# Patient Record
Sex: FEMALE | Race: OTHER | NOT HISPANIC OR LATINO | Employment: UNEMPLOYED | URBAN - METROPOLITAN AREA
[De-identification: names, ages, dates, MRNs, and addresses within clinical notes are randomized per-mention and may not be internally consistent; named-entity substitution may affect disease eponyms.]

---

## 2023-10-07 ENCOUNTER — APPOINTMENT (OUTPATIENT)
Dept: RADIOLOGY | Facility: HOSPITAL | Age: 53
End: 2023-10-07
Payer: MEDICAID

## 2023-10-07 ENCOUNTER — HOSPITAL ENCOUNTER (EMERGENCY)
Facility: HOSPITAL | Age: 53
Discharge: HOME | End: 2023-10-07
Attending: STUDENT IN AN ORGANIZED HEALTH CARE EDUCATION/TRAINING PROGRAM | Admitting: EMERGENCY MEDICINE
Payer: MEDICAID

## 2023-10-07 VITALS
OXYGEN SATURATION: 100 % | BODY MASS INDEX: 28.25 KG/M2 | HEIGHT: 67 IN | WEIGHT: 180 LBS | SYSTOLIC BLOOD PRESSURE: 149 MMHG | HEART RATE: 83 BPM | RESPIRATION RATE: 18 BRPM | DIASTOLIC BLOOD PRESSURE: 70 MMHG | TEMPERATURE: 98.4 F

## 2023-10-07 DIAGNOSIS — K59.00 CONSTIPATION, UNSPECIFIED CONSTIPATION TYPE: Primary | ICD-10-CM

## 2023-10-07 LAB
ABO GROUP (TYPE) IN BLOOD: NORMAL
ALBUMIN SERPL BCP-MCNC: 4.1 G/DL (ref 3.4–5)
ALP SERPL-CCNC: 83 U/L (ref 33–110)
ALT SERPL W P-5'-P-CCNC: 14 U/L (ref 7–45)
ANION GAP SERPL CALC-SCNC: 13 MMOL/L (ref 10–20)
AST SERPL W P-5'-P-CCNC: 25 U/L (ref 9–39)
BASOPHILS # BLD AUTO: 0.05 X10*3/UL (ref 0–0.1)
BASOPHILS NFR BLD AUTO: 1 %
BILIRUB SERPL-MCNC: 0.3 MG/DL (ref 0–1.2)
BUN SERPL-MCNC: 17 MG/DL (ref 6–23)
BURR CELLS BLD QL SMEAR: NORMAL
CALCIUM SERPL-MCNC: 8.7 MG/DL (ref 8.6–10.3)
CARDIAC TROPONIN I PNL SERPL HS: 3 NG/L (ref 0–13)
CHLORIDE SERPL-SCNC: 105 MMOL/L (ref 98–107)
CO2 SERPL-SCNC: 21 MMOL/L (ref 21–32)
CREAT SERPL-MCNC: 0.88 MG/DL (ref 0.5–1.05)
DACRYOCYTES BLD QL SMEAR: NORMAL
EOSINOPHIL # BLD AUTO: 0.05 X10*3/UL (ref 0–0.7)
EOSINOPHIL NFR BLD AUTO: 1 %
ERYTHROCYTE [DISTWIDTH] IN BLOOD BY AUTOMATED COUNT: 21.2 % (ref 11.5–14.5)
GFR SERPL CREATININE-BSD FRML MDRD: 79 ML/MIN/1.73M*2
GLUCOSE SERPL-MCNC: 89 MG/DL (ref 74–99)
HCT VFR BLD AUTO: 25.8 % (ref 36–46)
HGB BLD-MCNC: 6.9 G/DL (ref 12–16)
HYPOCHROMIA BLD QL SMEAR: NORMAL
IMM GRANULOCYTES # BLD AUTO: 0.01 X10*3/UL (ref 0–0.7)
IMM GRANULOCYTES NFR BLD AUTO: 0.2 % (ref 0–0.9)
LIPASE SERPL-CCNC: 26 U/L (ref 9–82)
LYMPHOCYTES # BLD AUTO: 0.75 X10*3/UL (ref 1.2–4.8)
LYMPHOCYTES NFR BLD AUTO: 14.6 %
MCH RBC QN AUTO: 19.2 PG (ref 26–34)
MCHC RBC AUTO-ENTMCNC: 26.7 G/DL (ref 32–36)
MCV RBC AUTO: 72 FL (ref 80–100)
MONOCYTES # BLD AUTO: 0.36 X10*3/UL (ref 0.1–1)
MONOCYTES NFR BLD AUTO: 7 %
NEUTROPHILS # BLD AUTO: 3.93 X10*3/UL (ref 1.2–7.7)
NEUTROPHILS NFR BLD AUTO: 76.2 %
NRBC BLD-RTO: 0 /100 WBCS (ref 0–0)
OVALOCYTES BLD QL SMEAR: NORMAL
PLATELET # BLD AUTO: 298 X10*3/UL (ref 150–450)
PMV BLD AUTO: 10.2 FL (ref 7.5–11.5)
POTASSIUM SERPL-SCNC: 4.7 MMOL/L (ref 3.5–5.3)
PROT SERPL-MCNC: 7.2 G/DL (ref 6.4–8.2)
RBC # BLD AUTO: 3.6 X10*6/UL (ref 4–5.2)
RBC MORPH BLD: NORMAL
RH FACTOR (ANTIGEN D): NORMAL
SCHISTOCYTES BLD QL SMEAR: NORMAL
SODIUM SERPL-SCNC: 134 MMOL/L (ref 136–145)
TARGETS BLD QL SMEAR: NORMAL
WBC # BLD AUTO: 5.2 X10*3/UL (ref 4.4–11.3)

## 2023-10-07 PROCEDURE — 74177 CT ABD & PELVIS W/CONTRAST: CPT

## 2023-10-07 PROCEDURE — 80053 COMPREHEN METABOLIC PANEL: CPT | Performed by: PHYSICIAN ASSISTANT

## 2023-10-07 PROCEDURE — 36415 COLL VENOUS BLD VENIPUNCTURE: CPT | Performed by: PHYSICIAN ASSISTANT

## 2023-10-07 PROCEDURE — 96360 HYDRATION IV INFUSION INIT: CPT | Mod: 59

## 2023-10-07 PROCEDURE — 2500000004 HC RX 250 GENERAL PHARMACY W/ HCPCS (ALT 636 FOR OP/ED): Performed by: PHYSICIAN ASSISTANT

## 2023-10-07 PROCEDURE — 85025 COMPLETE CBC W/AUTO DIFF WBC: CPT | Performed by: PHYSICIAN ASSISTANT

## 2023-10-07 PROCEDURE — 99284 EMERGENCY DEPT VISIT MOD MDM: CPT | Performed by: STUDENT IN AN ORGANIZED HEALTH CARE EDUCATION/TRAINING PROGRAM

## 2023-10-07 PROCEDURE — 2550000001 HC RX 255 CONTRASTS: Performed by: STUDENT IN AN ORGANIZED HEALTH CARE EDUCATION/TRAINING PROGRAM

## 2023-10-07 PROCEDURE — 83690 ASSAY OF LIPASE: CPT | Performed by: PHYSICIAN ASSISTANT

## 2023-10-07 PROCEDURE — 74177 CT ABD & PELVIS W/CONTRAST: CPT | Performed by: RADIOLOGY

## 2023-10-07 PROCEDURE — 84484 ASSAY OF TROPONIN QUANT: CPT | Performed by: PHYSICIAN ASSISTANT

## 2023-10-07 RX ORDER — POLYETHYLENE GLYCOL 3350 17 G/17G
17 POWDER, FOR SOLUTION ORAL DAILY
Qty: 30 PACKET | Refills: 0 | Status: SHIPPED | OUTPATIENT
Start: 2023-10-07 | End: 2023-11-06

## 2023-10-07 RX ORDER — ONDANSETRON HYDROCHLORIDE 2 MG/ML
4 INJECTION, SOLUTION INTRAVENOUS ONCE
Status: DISCONTINUED | OUTPATIENT
Start: 2023-10-07 | End: 2023-10-08 | Stop reason: HOSPADM

## 2023-10-07 RX ORDER — BISACODYL 10 MG/1
10 SUPPOSITORY RECTAL DAILY
Qty: 12 SUPPOSITORY | Refills: 0 | Status: SHIPPED | OUTPATIENT
Start: 2023-10-07 | End: 2023-10-19

## 2023-10-07 RX ADMIN — SODIUM CHLORIDE 1000 ML: 9 INJECTION, SOLUTION INTRAVENOUS at 19:07

## 2023-10-07 RX ADMIN — IOHEXOL 75 ML: 350 INJECTION, SOLUTION INTRAVENOUS at 21:05

## 2023-10-07 ASSESSMENT — COLUMBIA-SUICIDE SEVERITY RATING SCALE - C-SSRS
6. HAVE YOU EVER DONE ANYTHING, STARTED TO DO ANYTHING, OR PREPARED TO DO ANYTHING TO END YOUR LIFE?: NO
1. IN THE PAST MONTH, HAVE YOU WISHED YOU WERE DEAD OR WISHED YOU COULD GO TO SLEEP AND NOT WAKE UP?: NO
2. HAVE YOU ACTUALLY HAD ANY THOUGHTS OF KILLING YOURSELF?: NO

## 2023-10-07 ASSESSMENT — PAIN DESCRIPTION - LOCATION: LOCATION: ABDOMEN

## 2023-10-07 ASSESSMENT — PAIN SCALES - GENERAL: PAINLEVEL_OUTOF10: 8

## 2023-10-07 ASSESSMENT — PAIN DESCRIPTION - FREQUENCY: FREQUENCY: CONSTANT/CONTINUOUS

## 2023-10-07 ASSESSMENT — PAIN DESCRIPTION - PAIN TYPE: TYPE: ACUTE PAIN

## 2023-10-07 ASSESSMENT — PAIN - FUNCTIONAL ASSESSMENT: PAIN_FUNCTIONAL_ASSESSMENT: 0-10

## 2023-10-07 ASSESSMENT — PAIN DESCRIPTION - DESCRIPTORS: DESCRIPTORS: PRESSURE

## 2024-05-07 ENCOUNTER — APPOINTMENT (OUTPATIENT)
Dept: PRIMARY CARE | Facility: CLINIC | Age: 54
End: 2024-05-07
Payer: MEDICAID

## 2024-05-07 ENCOUNTER — TELEPHONE (OUTPATIENT)
Dept: PRIMARY CARE | Facility: CLINIC | Age: 54
End: 2024-05-07

## 2024-05-27 ENCOUNTER — HOSPITAL ENCOUNTER (EMERGENCY)
Facility: HOSPITAL | Age: 54
Discharge: HOME | End: 2024-05-27
Attending: EMERGENCY MEDICINE
Payer: MEDICAID

## 2024-05-27 ENCOUNTER — APPOINTMENT (OUTPATIENT)
Dept: RADIOLOGY | Facility: HOSPITAL | Age: 54
End: 2024-05-27
Payer: MEDICAID

## 2024-05-27 VITALS
WEIGHT: 185 LBS | DIASTOLIC BLOOD PRESSURE: 64 MMHG | SYSTOLIC BLOOD PRESSURE: 125 MMHG | HEIGHT: 66 IN | TEMPERATURE: 98.6 F | BODY MASS INDEX: 29.73 KG/M2 | RESPIRATION RATE: 16 BRPM | HEART RATE: 70 BPM | OXYGEN SATURATION: 100 %

## 2024-05-27 DIAGNOSIS — G51.0 BELL'S PALSY: Primary | ICD-10-CM

## 2024-05-27 LAB
ALBUMIN SERPL BCP-MCNC: 3.9 G/DL (ref 3.4–5)
ALP SERPL-CCNC: 83 U/L (ref 33–110)
ALT SERPL W P-5'-P-CCNC: 16 U/L (ref 7–45)
ANION GAP SERPL CALC-SCNC: 12 MMOL/L (ref 10–20)
APAP SERPL-MCNC: <10 UG/ML
APTT PPP: 30 SECONDS (ref 27–38)
AST SERPL W P-5'-P-CCNC: 22 U/L (ref 9–39)
BASOPHILS # BLD AUTO: 0.04 X10*3/UL (ref 0–0.1)
BASOPHILS NFR BLD AUTO: 1.2 %
BILIRUB SERPL-MCNC: 0.4 MG/DL (ref 0–1.2)
BUN SERPL-MCNC: 17 MG/DL (ref 6–23)
CALCIUM SERPL-MCNC: 9.1 MG/DL (ref 8.6–10.3)
CARDIAC TROPONIN I PNL SERPL HS: <3 NG/L (ref 0–13)
CHLORIDE SERPL-SCNC: 107 MMOL/L (ref 98–107)
CO2 SERPL-SCNC: 24 MMOL/L (ref 21–32)
CREAT SERPL-MCNC: 0.99 MG/DL (ref 0.5–1.05)
DACRYOCYTES BLD QL SMEAR: NORMAL
EGFRCR SERPLBLD CKD-EPI 2021: 68 ML/MIN/1.73M*2
EOSINOPHIL # BLD AUTO: 0.11 X10*3/UL (ref 0–0.7)
EOSINOPHIL NFR BLD AUTO: 3.3 %
ERYTHROCYTE [DISTWIDTH] IN BLOOD BY AUTOMATED COUNT: 21.2 % (ref 11.5–14.5)
ETHANOL SERPL-MCNC: <10 MG/DL
GLUCOSE SERPL-MCNC: 112 MG/DL (ref 74–99)
HCT VFR BLD AUTO: 26 % (ref 36–46)
HGB BLD-MCNC: 6.8 G/DL (ref 12–16)
HYPOCHROMIA BLD QL SMEAR: NORMAL
IMM GRANULOCYTES # BLD AUTO: 0.01 X10*3/UL (ref 0–0.7)
IMM GRANULOCYTES NFR BLD AUTO: 0.3 % (ref 0–0.9)
INR PPP: 0.9 (ref 0.9–1.1)
LYMPHOCYTES # BLD AUTO: 1.2 X10*3/UL (ref 1.2–4.8)
LYMPHOCYTES NFR BLD AUTO: 35.9 %
MCH RBC QN AUTO: 18.3 PG (ref 26–34)
MCHC RBC AUTO-ENTMCNC: 26.2 G/DL (ref 32–36)
MCV RBC AUTO: 70 FL (ref 80–100)
MONOCYTES # BLD AUTO: 0.33 X10*3/UL (ref 0.1–1)
MONOCYTES NFR BLD AUTO: 9.9 %
NEUTROPHILS # BLD AUTO: 1.65 X10*3/UL (ref 1.2–7.7)
NEUTROPHILS NFR BLD AUTO: 49.4 %
NRBC BLD-RTO: 0 /100 WBCS (ref 0–0)
OVALOCYTES BLD QL SMEAR: NORMAL
PLATELET # BLD AUTO: 351 X10*3/UL (ref 150–450)
POLYCHROMASIA BLD QL SMEAR: NORMAL
POTASSIUM SERPL-SCNC: 4.2 MMOL/L (ref 3.5–5.3)
PROT SERPL-MCNC: 7 G/DL (ref 6.4–8.2)
PROTHROMBIN TIME: 10.3 SECONDS (ref 9.8–12.8)
RBC # BLD AUTO: 3.72 X10*6/UL (ref 4–5.2)
RBC MORPH BLD: NORMAL
SALICYLATES SERPL-MCNC: <3 MG/DL
SODIUM SERPL-SCNC: 139 MMOL/L (ref 136–145)
WBC # BLD AUTO: 3.3 X10*3/UL (ref 4.4–11.3)

## 2024-05-27 PROCEDURE — 70450 CT HEAD/BRAIN W/O DYE: CPT

## 2024-05-27 PROCEDURE — 85610 PROTHROMBIN TIME: CPT | Performed by: PHYSICIAN ASSISTANT

## 2024-05-27 PROCEDURE — 80053 COMPREHEN METABOLIC PANEL: CPT | Performed by: PHYSICIAN ASSISTANT

## 2024-05-27 PROCEDURE — 99285 EMERGENCY DEPT VISIT HI MDM: CPT

## 2024-05-27 PROCEDURE — 93005 ELECTROCARDIOGRAM TRACING: CPT

## 2024-05-27 PROCEDURE — 84484 ASSAY OF TROPONIN QUANT: CPT | Performed by: PHYSICIAN ASSISTANT

## 2024-05-27 PROCEDURE — 36415 COLL VENOUS BLD VENIPUNCTURE: CPT | Performed by: PHYSICIAN ASSISTANT

## 2024-05-27 PROCEDURE — 80179 DRUG ASSAY SALICYLATE: CPT | Performed by: PHYSICIAN ASSISTANT

## 2024-05-27 PROCEDURE — 70450 CT HEAD/BRAIN W/O DYE: CPT | Performed by: RADIOLOGY

## 2024-05-27 PROCEDURE — 85730 THROMBOPLASTIN TIME PARTIAL: CPT | Performed by: PHYSICIAN ASSISTANT

## 2024-05-27 PROCEDURE — 71045 X-RAY EXAM CHEST 1 VIEW: CPT | Performed by: RADIOLOGY

## 2024-05-27 PROCEDURE — 71045 X-RAY EXAM CHEST 1 VIEW: CPT

## 2024-05-27 PROCEDURE — 85025 COMPLETE CBC W/AUTO DIFF WBC: CPT | Performed by: PHYSICIAN ASSISTANT

## 2024-05-27 RX ORDER — VALACYCLOVIR HYDROCHLORIDE 1 G/1
1000 TABLET, FILM COATED ORAL 3 TIMES DAILY
Qty: 21 TABLET | Refills: 0 | Status: SHIPPED | OUTPATIENT
Start: 2024-05-27 | End: 2024-06-03

## 2024-05-27 RX ORDER — PREDNISONE 20 MG/1
60 TABLET ORAL DAILY
Qty: 21 TABLET | Refills: 0 | Status: SHIPPED | OUTPATIENT
Start: 2024-05-27 | End: 2024-06-03

## 2024-05-27 ASSESSMENT — COLUMBIA-SUICIDE SEVERITY RATING SCALE - C-SSRS
1. IN THE PAST MONTH, HAVE YOU WISHED YOU WERE DEAD OR WISHED YOU COULD GO TO SLEEP AND NOT WAKE UP?: NO
2. HAVE YOU ACTUALLY HAD ANY THOUGHTS OF KILLING YOURSELF?: NO
6. HAVE YOU EVER DONE ANYTHING, STARTED TO DO ANYTHING, OR PREPARED TO DO ANYTHING TO END YOUR LIFE?: NO

## 2024-05-27 ASSESSMENT — PAIN SCALES - GENERAL: PAINLEVEL_OUTOF10: 0 - NO PAIN

## 2024-05-27 ASSESSMENT — PAIN - FUNCTIONAL ASSESSMENT: PAIN_FUNCTIONAL_ASSESSMENT: 0-10

## 2024-05-27 NOTE — ED TRIAGE NOTES
Pt to ED from home c/o tingling to face that has resolved, pt states first noticed Saturday at 1000. Pt A&Ox4, answering questions appropriately in triage

## 2024-05-27 NOTE — ED PROVIDER NOTES
EMERGENCY MEDICINE EVALUATION NOTE    History of Present Illness     Chief Complaint:   Chief Complaint   Patient presents with    facial tingling - resolved       HPI: Sharon Reyes is a 54 y.o. female presents with a chief complaint of left-sided headache and facial tingling.  Patient reports that she has had a left-sided posterior headache now for about 2 days.  She reports that it then settled in her ear.  She noticed that the headache was starting to resolve but as the headache was resolving she started to get some tingling down the left side of her jaw earlier today.  Patient reports that she did not have any weakness or numbness anywhere just an abnormal tingling sensation in the left jaw area.  She denies any associated chest pain or shortness of breath.  Patient denies any weakness or numbness in any of her extremities.  She does port that she has a history of anemia.  Patient denies any history of any strokes.    Previous History   History reviewed. No pertinent past medical history.  History reviewed. No pertinent surgical history.  Social History     Tobacco Use    Smoking status: Never    Smokeless tobacco: Never   Vaping Use    Vaping status: Never Used   Substance Use Topics    Alcohol use: Never    Drug use: Yes     Types: Marijuana     No family history on file.  Allergies   Allergen Reactions    Toradol [Ketorolac] Other     diaphoresis     Current Outpatient Medications   Medication Instructions    predniSONE (DELTASONE) 60 mg, oral, Daily    valACYclovir (VALTREX) 1,000 mg, oral, 3 times daily       Physical Exam     Appearance: Alert, oriented , cooperative     Skin: Intact,  dry skin, no lesions, rash, petechiae or purpura.      Eyes: PERRLA, EOMs intact,  Conjunctiva pink      ENT: Hearing grossly intact. Pharynx clear     Neck: Supple. Trachea at midline.      Pulmonary: Clear bilaterally. No rales, rhonchi or wheezing. No accessory muscle use or stridor.     Cardiac: Normal rate and rhythm  without murmur     Abdomen: Soft, nontender, active bowel sounds.     Musculoskeletal: Full range of motion.  Equal push and pull bilateral lower extremities.  Equal  strength bilateral upper extremities.  Able to ambulate under her own power.     Neurological:Cranial nerves II through XII are grossly intact, normal sensation, no weakness, no focal findings identified.  No facial droop noted.     Results     Labs Reviewed   CBC WITH AUTO DIFFERENTIAL - Abnormal       Result Value    WBC 3.3 (*)     nRBC 0.0      RBC 3.72 (*)     Hemoglobin 6.8 (*)     Hematocrit 26.0 (*)     MCV 70 (*)     MCH 18.3 (*)     MCHC 26.2 (*)     RDW 21.2 (*)     Platelets 351      Neutrophils % 49.4      Immature Granulocytes %, Automated 0.3      Lymphocytes % 35.9      Monocytes % 9.9      Eosinophils % 3.3      Basophils % 1.2      Neutrophils Absolute 1.65      Immature Granulocytes Absolute, Automated 0.01      Lymphocytes Absolute 1.20      Monocytes Absolute 0.33      Eosinophils Absolute 0.11      Basophils Absolute 0.04     COMPREHENSIVE METABOLIC PANEL - Abnormal    Glucose 112 (*)     Sodium 139      Potassium 4.2      Chloride 107      Bicarbonate 24      Anion Gap 12      Urea Nitrogen 17      Creatinine 0.99      eGFR 68      Calcium 9.1      Albumin 3.9      Alkaline Phosphatase 83      Total Protein 7.0      AST 22      Bilirubin, Total 0.4      ALT 16     PROTIME-INR - Normal    Protime 10.3      INR 0.9     APTT - Normal    aPTT 30      Narrative:     The APTT is no longer used for monitoring Unfractionated Heparin Therapy. For monitoring Heparin Therapy, use the Heparin Assay.   TROPONIN I, HIGH SENSITIVITY - Normal    Troponin I, High Sensitivity <3      Narrative:     Less than 99th percentile of normal range cutoff-  Female and children under 18 years old <14 ng/L; Male <21 ng/L: Negative  Repeat testing should be performed if clinically indicated.     Female and children under 18 years old 14-50 ng/L; Male  21-50 ng/L:  Consistent with possible cardiac damage and possible increased clinical   risk. Serial measurements may help to assess extent of myocardial damage.     >50 ng/L: Consistent with cardiac damage, increased clinical risk and  myocardial infarction. Serial measurements may help assess extent of   myocardial damage.      NOTE: Children less than 1 year old may have higher baseline troponin   levels and results should be interpreted in conjunction with the overall   clinical context.     NOTE: Troponin I testing is performed using a different   testing methodology at [facility] than at other   [facility]. Direct result comparisons should only   be made within the same method.   ACUTE TOXICOLOGY PANEL, BLOOD - Normal    Acetaminophen <10.0      Salicylate  <3      Alcohol <10     URINALYSIS WITH REFLEX CULTURE AND MICROSCOPIC    Narrative:     The following orders were created for panel order Urinalysis with Reflex Culture and Microscopic.  Procedure                               Abnormality         Status                     ---------                               -----------         ------                     Urinalysis with Reflex C...[[id number]]                                                 Extra Urine Gray Tube[[id number]]                                                         Please view results for these tests on the individual orders.   URINALYSIS WITH REFLEX CULTURE AND MICROSCOPIC   EXTRA URINE GRAY TUBE   MORPHOLOGY    RBC Morphology See Below      Polychromasia Mild      Hypochromia Marked      Ovalocytes Few      Teardrop Cells Few       CT head wo IV contrast   Final Result   Negative exam.        MACRO:   None        Signed by: Nando Serrano 5/27/2024 6:20 PM   Dictation workstation:   [id number]      XR chest 1 view   Final Result   Stable surgical clips in the left upper quadrant of the abdomen.   Otherwise, negative exam.        MACRO:   None        Signed by: Nando  "Zach 5/27/2024 4:33 PM   Dictation workstation:   [id number]            ED Course & Medical Decision Making   Medications - No data to display  Heart Rate:  [78-90]   Temperature:  [37 °C (98.6 °F)]   Respirations:  [16-18]   BP: (120-157)/(66-83)   Height:  [167.6 cm (5' 6\")]   Weight:  [83.9 kg (185 lb)]   Pulse Ox:  [97 %-100 %]    ED Course as of 05/27/24 1832   Mon May 27, 2024   1828 On reassessment patient does have some flatness of the entire left side of her face.  At this time patient is unable to lift her left eyebrow or smile on the left side consistent with a Bell's palsy.  It appeared to be somewhat earlier but it is more consistent now.  Plan of care discussed with the patient.  We will treat her as if she has a Bell's palsy.  Patient is in agreement with this plan of care.  Patient will be given steroids as well as antivirals.  Patient is in agreement plan of care.  Patient also given referral to neurology.  Patient encouraged return to the emergency room immediately worsening symptoms.  Patient was reassessed by attending physician as well who agrees with this plan of care. [CJ]      ED Course User Index  [CJ] Tomasz Perez PA-C         Diagnoses as of 05/27/24 1832   Bell's palsy       Procedures   ECG 12 lead    Performed by: Tomasz Perez PA-C  Authorized by: Tomasz Perez PA-C    ECG interpreted by ED Physician in the absence of a cardiologist: yes    Rate:     ECG rate:  89  Rhythm:     Rhythm: sinus rhythm    ST segments:     ST segments:  Non-specific  T waves:     T waves: normal    Comments:      No STEMI      Diagnosis     1. Bell's palsy        Disposition   Discharged    ED Prescriptions       Medication Sig Dispense Start Date End Date Auth. Provider    valACYclovir (Valtrex) 1 gram tablet Take 1 tablet (1,000 mg) by mouth 3 times a day for 7 days. 21 tablet 5/27/2024 6/3/2024 Tomasz Perez PA-C    predniSONE (Deltasone) 20 mg tablet Take 3 tablets (60 mg) by mouth once daily for 7 " days. 21 tablet 5/27/2024 6/3/2024 Tomasz Perez PA-C            Disclaimer: This note was dictated by speech recognition. Minor errors in transcription may be present. Please call if questions.       Tomasz Perez PA-C  05/27/24 8259

## 2024-05-28 ENCOUNTER — APPOINTMENT (OUTPATIENT)
Dept: CARDIOLOGY | Facility: HOSPITAL | Age: 54
End: 2024-05-28
Payer: MEDICAID

## 2024-05-28 LAB
ATRIAL RATE: 89 BPM
P AXIS: 37 DEGREES
PR INTERVAL: 154 MS
Q ONSET: 251 MS
QRS COUNT: 14 BEATS
QRS DURATION: 97 MS
QT INTERVAL: 379 MS
QTC CALCULATION(BAZETT): 462 MS
QTC FREDERICIA: 432 MS
R AXIS: 39 DEGREES
T AXIS: 42 DEGREES
T OFFSET: 441 MS
VENTRICULAR RATE: 89 BPM

## 2024-05-31 ENCOUNTER — HOSPITAL ENCOUNTER (EMERGENCY)
Facility: HOSPITAL | Age: 54
Discharge: HOME | End: 2024-05-31
Attending: EMERGENCY MEDICINE
Payer: MEDICAID

## 2024-05-31 VITALS
HEART RATE: 69 BPM | BODY MASS INDEX: 28.93 KG/M2 | DIASTOLIC BLOOD PRESSURE: 74 MMHG | SYSTOLIC BLOOD PRESSURE: 129 MMHG | HEIGHT: 66 IN | TEMPERATURE: 98.1 F | OXYGEN SATURATION: 97 % | RESPIRATION RATE: 18 BRPM | WEIGHT: 180 LBS

## 2024-05-31 DIAGNOSIS — R42 DIZZINESS: Primary | ICD-10-CM

## 2024-05-31 PROCEDURE — 96374 THER/PROPH/DIAG INJ IV PUSH: CPT

## 2024-05-31 PROCEDURE — 2500000001 HC RX 250 WO HCPCS SELF ADMINISTERED DRUGS (ALT 637 FOR MEDICARE OP): Performed by: EMERGENCY MEDICINE

## 2024-05-31 PROCEDURE — 99284 EMERGENCY DEPT VISIT MOD MDM: CPT | Mod: 25

## 2024-05-31 PROCEDURE — 2500000004 HC RX 250 GENERAL PHARMACY W/ HCPCS (ALT 636 FOR OP/ED): Performed by: EMERGENCY MEDICINE

## 2024-05-31 PROCEDURE — 96361 HYDRATE IV INFUSION ADD-ON: CPT

## 2024-05-31 PROCEDURE — 96375 TX/PRO/DX INJ NEW DRUG ADDON: CPT

## 2024-05-31 RX ORDER — BACITRACIN ZINC AND POLYMYXIN B SULFATE 500; 10000 [USP'U]/G; [USP'U]/G
1 OINTMENT OPHTHALMIC EVERY 12 HOURS
Qty: 7 G | Refills: 0 | Status: SHIPPED | OUTPATIENT
Start: 2024-05-31 | End: 2024-06-07

## 2024-05-31 RX ORDER — METOCLOPRAMIDE HYDROCHLORIDE 5 MG/ML
10 INJECTION INTRAMUSCULAR; INTRAVENOUS ONCE
Status: COMPLETED | OUTPATIENT
Start: 2024-05-31 | End: 2024-05-31

## 2024-05-31 RX ORDER — MECLIZINE HYDROCHLORIDE 25 MG/1
25 TABLET ORAL 3 TIMES DAILY PRN
Qty: 20 TABLET | Refills: 0 | Status: SHIPPED | OUTPATIENT
Start: 2024-05-31

## 2024-05-31 RX ORDER — DIPHENHYDRAMINE HYDROCHLORIDE 50 MG/ML
25 INJECTION INTRAMUSCULAR; INTRAVENOUS ONCE
Status: COMPLETED | OUTPATIENT
Start: 2024-05-31 | End: 2024-05-31

## 2024-05-31 RX ORDER — MECLIZINE HCL 12.5 MG 12.5 MG/1
25 TABLET ORAL ONCE
Status: COMPLETED | OUTPATIENT
Start: 2024-05-31 | End: 2024-05-31

## 2024-05-31 RX ORDER — HYDROCODONE BITARTRATE AND ACETAMINOPHEN 5; 325 MG/1; MG/1
1 TABLET ORAL ONCE
Status: COMPLETED | OUTPATIENT
Start: 2024-05-31 | End: 2024-05-31

## 2024-05-31 RX ADMIN — METOCLOPRAMIDE 10 MG: 5 INJECTION, SOLUTION INTRAMUSCULAR; INTRAVENOUS at 14:12

## 2024-05-31 RX ADMIN — SODIUM CHLORIDE 500 ML: 9 INJECTION, SOLUTION INTRAVENOUS at 14:11

## 2024-05-31 RX ADMIN — MECLIZINE 25 MG: 12.5 TABLET ORAL at 15:17

## 2024-05-31 RX ADMIN — DIPHENHYDRAMINE HYDROCHLORIDE 25 MG: 50 INJECTION, SOLUTION INTRAMUSCULAR; INTRAVENOUS at 14:12

## 2024-05-31 RX ADMIN — HYDROCODONE BITARTRATE AND ACETAMINOPHEN 1 TABLET: 5; 325 TABLET ORAL at 16:00

## 2024-05-31 ASSESSMENT — PAIN SCALES - GENERAL
PAINLEVEL_OUTOF10: 10 - WORST POSSIBLE PAIN
PAINLEVEL_OUTOF10: 10 - WORST POSSIBLE PAIN

## 2024-05-31 ASSESSMENT — PAIN - FUNCTIONAL ASSESSMENT: PAIN_FUNCTIONAL_ASSESSMENT: 0-10

## 2024-05-31 ASSESSMENT — COLUMBIA-SUICIDE SEVERITY RATING SCALE - C-SSRS
1. IN THE PAST MONTH, HAVE YOU WISHED YOU WERE DEAD OR WISHED YOU COULD GO TO SLEEP AND NOT WAKE UP?: NO
6. HAVE YOU EVER DONE ANYTHING, STARTED TO DO ANYTHING, OR PREPARED TO DO ANYTHING TO END YOUR LIFE?: NO
2. HAVE YOU ACTUALLY HAD ANY THOUGHTS OF KILLING YOURSELF?: NO

## 2024-05-31 ASSESSMENT — PAIN DESCRIPTION - PAIN TYPE: TYPE: ACUTE PAIN

## 2024-05-31 NOTE — ED PROVIDER NOTES
HPI   Chief Complaint   Patient presents with   • Dizziness     Diagnosed with bells palsy Monday       Patient presents for with dizziness.  I saw this patient 4 days ago when she had a left-sided headache and facial tingling.  She was diagnosed with Bell's palsy.  We gave her Valtrex and prednisone to take at home.  She states that she has been taking this.  Last night she developed a headache again as well as dizziness.  She states she could hardly stand up and felt nauseated.  She needed help into the house because of this.  She woke up today still feeling dizzy.  She describes as a room spinning sensation.  She has had nausea.  Denies a history of vertigo in the past.  Denies any falls or trauma.  She has been keeping her left eye taped because the air hitting her eye was painful.                          Macario Coma Scale Score: 15                  Patient History   Past Medical History:   Diagnosis Date   • Bell's palsy      No past surgical history on file.  No family history on file.  Social History     Tobacco Use   • Smoking status: Never   • Smokeless tobacco: Never   Vaping Use   • Vaping status: Never Used   Substance Use Topics   • Alcohol use: Never   • Drug use: Yes     Types: Marijuana       Physical Exam   ED Triage Vitals [05/31/24 1302]   Temperature Heart Rate Respirations BP   36.7 °C (98.1 °F) 74 16 136/83      Pulse Ox Temp src Heart Rate Source Patient Position   100 % -- -- --      BP Location FiO2 (%)     -- 21 %       Physical Exam  Vitals and nursing note reviewed.   Constitutional:       Appearance: Normal appearance.   HENT:      Head: Normocephalic and atraumatic.      Mouth/Throat:      Mouth: Mucous membranes are moist.   Eyes:      Extraocular Movements: Extraocular movements intact.      Pupils: Pupils are equal, round, and reactive to light.      Comments: Bilateral nystagmus   Cardiovascular:      Rate and Rhythm: Normal rate and regular rhythm.      Heart sounds: No murmur  heard.  Pulmonary:      Effort: Pulmonary effort is normal. No respiratory distress.      Breath sounds: Normal breath sounds.   Abdominal:      General: There is no distension.      Palpations: Abdomen is soft.      Tenderness: There is no abdominal tenderness.   Musculoskeletal:         General: No tenderness or deformity. Normal range of motion.      Right lower leg: No edema.      Left lower leg: No edema.   Skin:     General: Skin is warm and dry.      Findings: No lesion or rash.   Neurological:      General: No focal deficit present.      Mental Status: She is alert and oriented to person, place, and time.      Sensory: No sensory deficit.      Motor: No weakness.      Comments: She still continues with left-sided facial droop and no wrinkling of the left forehead.  She cannot close her left eye all the way.   Psychiatric:         Behavior: Behavior normal.       Labs Reviewed - No data to display  No orders to display     ED Course & MDM   Diagnoses as of 05/31/24 8107   Dizziness       Medical Decision Making  Differentials include vertigo, dehydration, side effects of the medications for the Bell's palsy.  Imaging studies, if performed, were independently reviewed and interpreted by myself and confirmed by radiologist. EKG(s), if performed, were interpreted by myself.She was given IV fluids, Reglan and Benadryl.  Also give her meclizine.  I feel that her dizziness is likely due to having the Bell's palsy and keeping her left eye patched.  I did review her labs her hemoglobin was low but she refused a blood transfusion then and again refused today.  She has no active bleeding.  I will give her hematology follow-up as an outpatient.  I feel that her dizziness could be vertiginous in nature.  I will give her meclizine to take at home.  She will follow-up with her primary care physician.        Procedure  Procedures     Juan Luis Carney MD  05/31/24 8376

## 2024-06-05 ENCOUNTER — OFFICE VISIT (OUTPATIENT)
Dept: NEUROLOGY | Facility: HOSPITAL | Age: 54
End: 2024-06-05
Payer: MEDICAID

## 2024-06-05 VITALS
DIASTOLIC BLOOD PRESSURE: 84 MMHG | SYSTOLIC BLOOD PRESSURE: 136 MMHG | WEIGHT: 181.3 LBS | HEART RATE: 85 BPM | BODY MASS INDEX: 29.26 KG/M2

## 2024-06-05 DIAGNOSIS — D64.9 ANEMIA, UNSPECIFIED TYPE: ICD-10-CM

## 2024-06-05 DIAGNOSIS — R42 LIGHTHEADEDNESS: ICD-10-CM

## 2024-06-05 DIAGNOSIS — G51.0 FACIAL NERVE PALSY: Primary | ICD-10-CM

## 2024-06-05 DIAGNOSIS — M79.2 NEUROPATHIC PAIN: ICD-10-CM

## 2024-06-05 PROCEDURE — 99222 1ST HOSP IP/OBS MODERATE 55: CPT | Performed by: PSYCHIATRY & NEUROLOGY

## 2024-06-05 PROCEDURE — 1036F TOBACCO NON-USER: CPT | Performed by: PSYCHIATRY & NEUROLOGY

## 2024-06-05 RX ORDER — ACETAMINOPHEN 325 MG/1
325 TABLET ORAL EVERY 6 HOURS PRN
COMMUNITY

## 2024-06-05 RX ORDER — AMITRIPTYLINE HYDROCHLORIDE 25 MG/1
25 TABLET, FILM COATED ORAL NIGHTLY
Qty: 30 TABLET | Refills: 2 | Status: SHIPPED | OUTPATIENT
Start: 2024-06-05 | End: 2024-09-03

## 2024-06-05 ASSESSMENT — PAIN SCALES - GENERAL: PAINLEVEL: 5

## 2024-06-05 ASSESSMENT — ENCOUNTER SYMPTOMS
DEPRESSION: 0
OCCASIONAL FEELINGS OF UNSTEADINESS: 1
LOSS OF SENSATION IN FEET: 0

## 2024-06-05 NOTE — PROGRESS NOTES
"In-clinic visit    Visit type: provider referral: Dr Carney    PCP: No PCP.    Subjective     Sharon Reyes is a 54 y.o. year old right handed female who presents with Bell's palsy (ED visit 5/27/2027, returned to ED 5/31 for dizziness) and Facial Pain.    Patient is accompanied by none.     HPI    Saturday before memorial day (5/25/24), L side of head was hurting. Next day developed pain in front of L ear/inside ear canal. States felt funny on L face. Went to ER on 5/27/24, dx Bell's palsy. Also found to have anemia (chronic). No mention of ear pain in ER note. Prescribed antiviral and steroids, advised take Tylenol, referred to neurology. Went back to ER, due to dizziness on 5/31/24. Per ER note it was documented that she had ? vertigo and nausea. BP was 136/83, otherwise ok. She was told dizziness was ? Due to having L eye patched. Anemia again noted, pt did not want transfusion. Pt discharged.    Here today for evaluation.    Says L cheek feels funny. Overall ? a bit better. Taping eye shut.    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    Tells me today \"pain still there\". Pulsating. Normally didn't have pain. L ear canal \"feels swollen\". Of note this was not mentioned in ER note. Wants medication to try. Interfering with sleep.    No tick bite. No autoimmune disease. No hx lupus.    She describes continued dizziness, described as lightheadedness. Not vertigo. She does say it seems to be better starting yesterday. Known chronic anemia, used to get iron transfusions. Not getting blood transfusions--states she doesn't believe in it.    No known glaucoma. No known heart issues.    No tobacco. No alcohol. (+) Marijuana use.      Review of Systems   Constitutional:  Negative for appetite change, chills and fever.   HENT:  Negative for ear pain and nosebleeds.    Eyes:  Negative for discharge and itching.   Respiratory:  Negative for choking and chest tightness.    Cardiovascular:  " Negative for chest pain and palpitations.   Gastrointestinal:  Negative for abdominal distention, abdominal pain and nausea.   Endocrine: Negative for cold intolerance and heat intolerance.   Genitourinary:  Negative for difficulty urinating and dysuria.   Musculoskeletal:  Negative for gait problem and myalgias.   Neurological:  Negative for seizures.    There is no problem list on file for this patient.      Past Medical History:   Diagnosis Date    Bell's palsy      History reviewed. No pertinent surgical history.  Social History     Tobacco Use    Smoking status: Never    Smokeless tobacco: Never   Substance Use Topics    Alcohol use: Never     family history is not on file.    Allergies   Allergen Reactions    Toradol [Ketorolac] Other     diaphoresis       Current Outpatient Medications:     acetaminophen (Tylenol) 325 mg tablet, Take 1 tablet (325 mg) by mouth every 6 hours if needed for mild pain (1 - 3). 4 tabs each time, Disp: , Rfl:     bacitracin-polymyxin B (Polysporin) ophthalmic ointment, Apply 1 Application to left eye every 12 hours for 7 days., Disp: 7 g, Rfl: 0    meclizine (Antivert) 25 mg tablet, Take 1 tablet (25 mg) by mouth 3 times a day as needed for dizziness., Disp: 20 tablet, Rfl: 0    UNABLE TO FIND, Med Name: kratom, 4 tsp every day for chronic pain (fibro, arthritis), Disp: , Rfl:     UNABLE TO FIND, Med Name: exlax, Disp: , Rfl:     Objective     /84   Pulse 85   Wt 82.2 kg (181 lb 4.8 oz)   BMI 29.26 kg/m²     Awake, alert, oriented x3, in no distress  Well-nourished, ambulatory independently  No leg edema    Some pain on L ear canal, no redness, no lesions, no swelling on otoscopy, TM intact  R TM on otoscopy ok    Mental status exam as above, conversant   Fair fund of knowledge  Recent/remote memory fair  Fair attention span  Pupils round reactive to light, 3-4 mm, (-) RAPD   Fundoscopic examination was attempted but fundus was not visualized bilaterally   Full EOMs  intact, no nystagmus, no ptosis   (+) R exotropia  V1 to V3 sensation is intact   (+) peripheral L facial droop   Hearing grossly intact   No dysarthria  Good shoulder shrug bilaterally   Tongue is midline     Motor strength is 5/5 on all extremities, tone/bulk normal   Reflexes 1+ on all 4 extremities except absent on L ankle, downgoing toes bilaterally   Sensation is intact to light touch, vibration on all 4 extremities   Finger to nose test intact bilaterally   Negative Romberg sign   Normal gait       Lab Results   Component Value Date    WBC 3.3 (L) 05/27/2024    RBC 3.72 (L) 05/27/2024    HGB 6.8 (L) 05/27/2024    HCT 26.0 (L) 05/27/2024     05/27/2024     05/27/2024    K 4.2 05/27/2024     05/27/2024    BUN 17 05/27/2024    CREATININE 0.99 05/27/2024    EGFR 68 05/27/2024    CALCIUM 9.1 05/27/2024    ALKPHOS 83 05/27/2024    AST 22 05/27/2024    ALT 16 05/27/2024    MG 1.86 09/05/2023    TSH 13.32 (H) 09/05/2023        CT head wo IV contrast 05/27/2024    Narrative  Interpreted By:  Nando Serrano,  STUDY:  CT HEAD WO IV CONTRAST;  5/27/2024 5:45 pm    INDICATION:  Signs/Symptoms:Was sided facial tingling, headache.    COMPARISON:  Previous exam is from 04/21/2011.    ACCESSION NUMBER(S):  [id number]    ORDERING CLINICIAN:  SYLVIE VACA    TECHNIQUE:  Routine axial images were obtained from the skull base through the  vertex. Sagittal and coronal reconstruction images were generated.  Brain, subdural, and bone windows were reviewed. N/A Unavailable    FINDINGS:  INTRACRANIAL:  The ventricles, sulci and basal cisterns were within normal limits.  No acute intracranial bleed, midline shift, or focal mass effect.  No destructive bone lesion. No depressed skull fracture.  No abnormal skull base arterial calcifications.    EXTRACRANIAL:  Visualized paranasal sinuses were clear.  Visualized mastoid air cells were clear.    Impression  Negative exam.    MACRO:  None    Signed by: Nando Serrano  5/27/2024 6:20 PM  Dictation workstation:   [id number]      Assessment/Plan     Facial nerve palsy, L  L ear pain  Onset 5/25/24  Head CT wo ok  S/p antiviral and steroids  Has peripheral CN VII palsy on L on exam  Discussed, ear pain likely related to facial nerve palsy  Stroke doesn't appear to be the case  Will try amitriptyline for neuropathic pain    Discussed, I don't expect symptoms to worsen. Main goal in the meantime is prevention of complications. If symptoms worsen, need re-evaluation.    3. Dizziness  4. Chronic anemia  Dizziness sounds like lightheadedness (likely related to anemia) as well as component of from strabismus (?)--states patching eye improved it    Plans:  Continue eye care--tape eye shut during sleep  Try amitriptyline 25mg at bedtime--poss SE discussed, erx'd  Follow-up in ~4 months if you're not any better, sooner if you are worsening--may need more work-up  ? Therapy referral    Rtc pending above    All questions were answered.  Pt knows how to contact my office in case pt has any questions or concerns.    Kendrick Purcell MD

## 2024-06-05 NOTE — PATIENT INSTRUCTIONS
Plans:  Continue eye care--tape eye shut during sleep  Try amitriptyline 25mg at bedtime--poss SE discussed, erx'd  Follow-up in ~4 months if you're not any better, sooner if you are worsening--may need more work-up  ? Therapy referral    Rtc pending above

## 2024-06-06 ENCOUNTER — TELEPHONE (OUTPATIENT)
Dept: NEUROLOGY | Facility: HOSPITAL | Age: 54
End: 2024-06-06
Payer: MEDICAID

## 2024-06-06 DIAGNOSIS — G51.0 FACIAL NERVE PALSY: Primary | ICD-10-CM

## 2024-06-06 NOTE — TELEPHONE ENCOUNTER
6/6/24 11:58a  I called and spoke with pt about PT referral for facial nerve palsy, with PT Omid Torres at [place].  She is interested in trying  Order placed.  Dr sosa

## 2024-06-06 NOTE — TELEPHONE ENCOUNTER
6/6/24 Tried to call office back to discuss PT referral for bell's palsy. No answer--[facility]    Dr sosa

## 2024-06-14 ENCOUNTER — EVALUATION (OUTPATIENT)
Dept: PHYSICAL THERAPY | Facility: HOSPITAL | Age: 54
End: 2024-06-14
Payer: MEDICAID

## 2024-06-14 DIAGNOSIS — G51.0 FACIAL NERVE PALSY: ICD-10-CM

## 2024-06-14 DIAGNOSIS — R63.8 DIFFICULTY EATING: Primary | ICD-10-CM

## 2024-06-14 PROCEDURE — 97161 PT EVAL LOW COMPLEX 20 MIN: CPT | Mod: GP | Performed by: PHYSICAL THERAPIST

## 2024-06-14 PROCEDURE — 97110 THERAPEUTIC EXERCISES: CPT | Mod: GP | Performed by: PHYSICAL THERAPIST

## 2024-06-14 ASSESSMENT — PAIN SCALES - GENERAL: PAINLEVEL_OUTOF10: 0 - NO PAIN

## 2024-06-14 ASSESSMENT — PAIN - FUNCTIONAL ASSESSMENT: PAIN_FUNCTIONAL_ASSESSMENT: 0-10

## 2024-06-14 NOTE — PROGRESS NOTES
Physical Therapy    Physical Therapy Evaluation    Patient Name: Sharon Reyes  MRN: [id number]  : 1970  Referring Physician: Kendrick Purcell  Today's Date: 2024  Time Calculation  Start Time: 1000  Stop Time: 1048  Time Calculation (min): 48 min  PT Evaluation Time Entry  PT Evaluation (Low) Time Entry: 38  PT Therapeutic Procedures Time Entry  Therapeutic Exercise Time Entry: 10           General  General Comment: Vist #    Current Problem  Problem List Items Addressed This Visit             ICD-10-CM    Facial nerve palsy G51.0    Relevant Orders    Follow Up In Physical Therapy     Other Visit Diagnoses         Codes    Difficulty eating    -  Primary R63.8    Relevant Orders    Follow Up In Physical Therapy               SUBJECTIVE  Subjective   Patient reports Sx of left facial drooping as well as intermittent left ear pain that began 24 . This is leading to difficulty with drinking, talking and eating.  Pain is reported to range 0-10/10 with daily activities.  Patient states that their goal is to return her face back to normal with physical therapy intervention.  Pt. Currently must wear an eye patch and uses eye drop and Bacitracin d/t incomplete eye closure  Pt. Reports an chronic Hx of left side lazy eye.  Prior level of function: No functional limits    Patient's name and  were confirmed this date.        Precautions  Precautions  Precautions Comment: RA       Pain  Pain Assessment: 0-10  Pain Score: 0 - No pain        OBJECTIVE:    Pt. Shows incomplete left eye closure and inability to raise left eyebrow.  Deviation of lips to the right with pursed lips  No elevation of left corner or the mouth with smiling    No muscle contraction achieved with alternating current ES                    TREATMENT:    EXERCISES Date 24 Date Date Date   VISIT # # 1 # # #   HEP 10 min                    Direct ES to facial muscles:                            Raise eyebrows       Purse lips       Close  eyes       Flare nostrils                                                                                                                                                PATIENT EDUCATION:    Access Code: [email or web address]  URL: [email or web address]  Date: 06/14/2024  Prepared by: Omid Torres    Exercises  - Smile  - 3 x daily - 7 x weekly - 10 reps - 2 seconds hold  - Pucker Lips  - 3 x daily - 7 x weekly - 10 reps - 2 seconds hold  - Closing Eyes  - 3 x daily - 7 x weekly - 10 reps - 2 seconds hold  - Eyebrow Raise  - 3 x daily - 7 x weekly - 10 reps - 2 seconds hold  ASSESSEMENT  Pt requires skilled PT to improve communication, eating and eye safety    Rehab potential: Good    PLAN  Treatment/Interventions: Electrical stimulation, Therapeutic exercises  PT Plan: Skilled PT  PT Frequency: 2 times per week  Duration: 8 weeks  Direct ES for muscle reeducation  Pt. Is in agreement with PT plan.  Goals:  Active       PT Problem       PT Goal 1       Start:  06/14/24    Expected End:  08/13/24       Short tem goals to be achieve within 4 weeks:    1. Pt will achieve full left eye closure to avoid eye damaged    Long term goals to be achieved within 8 weeks:    1. Pt. Will achieve full left corner of mouth elevation with smile to improve communication  2. Pt brandan consistently drink out of a cup without spilling the liquid  3.   Pt's stated goal is to gain normal facial control

## 2024-06-24 ENCOUNTER — TREATMENT (OUTPATIENT)
Dept: PHYSICAL THERAPY | Facility: HOSPITAL | Age: 54
End: 2024-06-24
Payer: MEDICAID

## 2024-06-24 DIAGNOSIS — G51.0 FACIAL NERVE PALSY: ICD-10-CM

## 2024-06-24 DIAGNOSIS — R63.8 DIFFICULTY EATING: ICD-10-CM

## 2024-06-24 PROCEDURE — 97110 THERAPEUTIC EXERCISES: CPT | Mod: GP | Performed by: PHYSICAL THERAPIST

## 2024-06-24 PROCEDURE — 97032 APPL MODALITY 1+ESTIM EA 15: CPT | Mod: GP | Performed by: PHYSICAL THERAPIST

## 2024-06-24 ASSESSMENT — PAIN - FUNCTIONAL ASSESSMENT: PAIN_FUNCTIONAL_ASSESSMENT: 0-10

## 2024-06-24 ASSESSMENT — PAIN SCALES - GENERAL: PAINLEVEL_OUTOF10: 0 - NO PAIN

## 2024-06-24 NOTE — PROGRESS NOTES
Physical Therapy    Physical Therapy Treatment    Patient Name: Sharon Reyes  MRN: [id number]  : 1970   Kendrick Purcell  Today's Date: 2024  Time Calculation  Start Time: 845  Stop Time: 925  Time Calculation (min): 40 min  PT Therapeutic Procedures Time Entry  Therapeutic Exercise Time Entry: 30  PT Modalities Time Entry  E-Stim (Attended) Time Entry: 10        General  General Comment: Vist #  Visit #2     Current Problem  Problem List Items Addressed This Visit             ICD-10-CM    Facial nerve palsy G51.0     Other Visit Diagnoses         Codes    Difficulty eating     R63.8                 Subjective   Pt. Reports no difficulty with HEP   She reports that she is now having less facial paresthesia but is still having difficulty manipulating food in her mouth while eating    Pt. Reports compliance with HEP.    Precautions  Precautions  Precautions Comment: RA    Pain  Pain Assessment: 0-10  0-10 (Numeric) Pain Score: 0 - No pain    Objective          Outcome Measures:      Treatments:  EXERCISES Date 24 Date 24 Date Date   VISIT # # 1 #2 # #   HEP 10 min                    Direct attended ES to facial muscles:   10'                          Raise eyebrows   2x10     Purse lips   2x10     Close eyes   2x10     Flare nostrils   2x10     Smile  2x10                                                                                                                                         Patient Education:  Access Code: [email or web address]  URL: [email or web address]  Date: 2024  Prepared by: Omid Torres    Exercises  - Smile  - 3 x daily - 7 x weekly - 10 reps - 2 seconds hold  - Pucker Lips  - 3 x daily - 7 x weekly - 10 reps - 2 seconds hold  - Closing Eyes  - 3 x daily - 7 x weekly - 10 reps - 2 seconds hold  - Eyebrow Raise  - 3 x daily - 7 x weekly - 10 reps - 2 seconds hold  Assessment:   Good contraction of facial muscles with direct ES today    Plan:  OP PT  Plan  Treatment/Interventions: Electrical stimulation, Therapeutic exercises  PT Plan: Skilled PT  PT Frequency: 2 times per week  Duration: 8 weeks    Goals:  Active       PT Problem       PT Goal 1       Start:  06/14/24    Expected End:  08/13/24       Short tem goals to be achieve within 4 weeks:    1. Pt will achieve full left eye closure to avoid eye damaged    Long term goals to be achieved within 8 weeks:    1. Pt. Will achieve full left corner of mouth elevation with smile to improve communication  2. Pt brandan consistently drink out of a cup without spilling the liquid  3.   Pt's stated goal is to gain normal facial control

## 2024-06-28 ENCOUNTER — TREATMENT (OUTPATIENT)
Dept: PHYSICAL THERAPY | Facility: HOSPITAL | Age: 54
End: 2024-06-28
Payer: MEDICAID

## 2024-06-28 DIAGNOSIS — R63.8 DIFFICULTY EATING: ICD-10-CM

## 2024-06-28 DIAGNOSIS — G51.0 FACIAL NERVE PALSY: ICD-10-CM

## 2024-06-28 PROCEDURE — 97110 THERAPEUTIC EXERCISES: CPT | Mod: GP | Performed by: PHYSICAL THERAPIST

## 2024-06-28 PROCEDURE — 97032 APPL MODALITY 1+ESTIM EA 15: CPT | Mod: GP | Performed by: PHYSICAL THERAPIST

## 2024-06-28 ASSESSMENT — PAIN SCALES - GENERAL: PAINLEVEL_OUTOF10: 0 - NO PAIN

## 2024-06-28 ASSESSMENT — PAIN - FUNCTIONAL ASSESSMENT: PAIN_FUNCTIONAL_ASSESSMENT: 0-10

## 2024-06-28 NOTE — PROGRESS NOTES
Physical Therapy    Physical Therapy Treatment    Patient Name: Sharon Reyes  MRN: [id number]  : 1970   Kendrick Purcell  Today's Date: 2024  Time Calculation  Start Time: 830  Stop Time: 910  Time Calculation (min): 40 min  PT Therapeutic Procedures Time Entry  Therapeutic Exercise Time Entry: 30  PT Modalities Time Entry  E-Stim (Attended) Time Entry: 10        General  General Comment: Vist #3/16  Visit #3     Current Problem  Problem List Items Addressed This Visit             ICD-10-CM    Facial nerve palsy G51.0     Other Visit Diagnoses         Codes    Difficulty eating     R63.8                 Subjective   Pt. Notes improve resting facial tone     Pt. Reports compliance with HEP.    Precautions  Precautions  Precautions Comment: RA    Pain  Pain Assessment: 0-10  0-10 (Numeric) Pain Score: 0 - No pain    Objective      A slight ability to elevate left eyebrow and to partially close left eye has improved  Intermittent Full left eye closure achieved today with DC ES        Treatments:    EXERCISES Date 24 Date 24 Date 24 Date   VISIT # # 1 #2 #3 #   HEP 10 min                    Direct attended ES to facial muscles:  Eyebrow, corner of eye, bottom of eye,  Corner of mouth   10'  10'                         Raise eyebrows   2x10  2x10    Purse lips   2x10  2x10    Close eyes   2x10  2x10    Flare nostrils   2x10  2x10    Smile  2x10  2x10               Patient Education:  Access Code: [email or web address]  URL: [email or web address]  Date: 2024  Prepared by: Omid Torres    Exercises  - Smile  - 3 x daily - 7 x weekly - 10 reps - 2 seconds hold  - Pucker Lips  - 3 x daily - 7 x weekly - 10 reps - 2 seconds hold  - Closing Eyes  - 3 x daily - 7 x weekly - 10 reps - 2 seconds hold  - Eyebrow Raise  - 3 x daily - 7 x weekly - 10 reps - 2 seconds hold  Assessment:   Muscular control is improving but pt. Still requires DC ES d/t denervation of facial muscles    Plan:  OP PT  Plan  Treatment/Interventions: Electrical stimulation, Therapeutic exercises  PT Plan: Skilled PT  PT Frequency: 2 times per week  Duration: 8 weeks    Goals:  Active       PT Problem       PT Goal 1       Start:  06/14/24    Expected End:  08/13/24       Short tem goals to be achieve within 4 weeks:    1. Pt will achieve full left eye closure to avoid eye damaged (Progressing 6/28/24)    Long term goals to be achieved within 8 weeks:    1. Pt. Will achieve full left corner of mouth elevation with smile to improve communication  2. Pt brandan consistently drink out of a cup without spilling the liquid  3.   Pt's stated goal is to gain normal facial control

## 2024-07-01 ENCOUNTER — TREATMENT (OUTPATIENT)
Dept: PHYSICAL THERAPY | Facility: HOSPITAL | Age: 54
End: 2024-07-01
Payer: MEDICAID

## 2024-07-01 DIAGNOSIS — G51.0 FACIAL NERVE PALSY: Primary | ICD-10-CM

## 2024-07-01 DIAGNOSIS — R63.8 DIFFICULTY EATING: ICD-10-CM

## 2024-07-01 PROCEDURE — 97032 APPL MODALITY 1+ESTIM EA 15: CPT | Mod: GP

## 2024-07-01 PROCEDURE — 97110 THERAPEUTIC EXERCISES: CPT | Mod: GP

## 2024-07-01 ASSESSMENT — PAIN - FUNCTIONAL ASSESSMENT: PAIN_FUNCTIONAL_ASSESSMENT: 0-10

## 2024-07-01 ASSESSMENT — PAIN SCALES - GENERAL: PAINLEVEL_OUTOF10: 0 - NO PAIN

## 2024-07-01 NOTE — PROGRESS NOTES
Physical Therapy    Physical Therapy Treatment    Patient Name: Sharon Reyes  MRN: [id number]  Today's Date: 7/1/2024  Time Calculation  Start Time: 1046  Stop Time: 1117  Time Calculation (min): 31 min  PT Therapeutic Procedures Time Entry  Therapeutic Exercise Time Entry: 21  PT Modalities Time Entry  E-Stim (Attended) Time Entry: 10  Auth Visit Dates: 6/14/24-6/14/25  VISIT# 4    Current Problem  Problem List Items Addressed This Visit             ICD-10-CM    Facial nerve palsy - Primary G51.0     Other Visit Diagnoses         Codes    Difficulty eating     R63.8            Subjective   Pt reports compliance HEP 2x daily. Pt reports ringing in L ear is improving but is still present. Pt reports frustration with symptoms today.      Precautions  Precautions  STEADI Fall Risk Score (The score of 4 or more indicates an increased risk of falling): no change  Precautions Comment: RA       Pain  Pain Assessment: 0-10  0-10 (Numeric) Pain Score: 0 - No pain       Objective   Continued POC   Left eye continues to demonstrate ability to partially close with improvements post DC ES    Treatments:  EXERCISES Date 6/14/24 Date 6/24/24 Date 6/28/24 Date 7/1/24   VISIT # # 1 #2 #3 #4   HEP 10 min                    Direct attended ES to facial muscles:  Eyebrow, corner of eye, bottom of eye,  Corner of mouth   10'  10' 10'                         Raise eyebrows   2x10  2x10 2x10   Purse lips   2x10  2x10 2x10   Close eyes   2x10  2x10 2x10   Flare nostrils   2x10  2x10 2x10   Smile  2x10  2x10 2x10            HEP:   Access Code: [email or web address]  URL: [email or web address]  Date: 06/14/2024  Prepared by: Omid Torres  Exercises  - Smile  - 3 x daily - 7 x weekly - 10 reps - 2 seconds hold  - Pucker Lips  - 3 x daily - 7 x weekly - 10 reps - 2 seconds hold  - Closing Eyes  - 3 x daily - 7 x weekly - 10 reps - 2 seconds hold  - Eyebrow Raise  - 3 x daily - 7 x weekly - 10 reps - 2 seconds hold    Assessment:  Pt  continues to tolerate treatment well. Pt reports feeling of tingling throughout L side of face and states she feels ability to close L eye and pucker L side of lips has improved at this time in session.     OP EDUCATION:  Outpatient Education  Individual(s) Educated: Patient  Education Provided: Anatomy, Body Mechanics, Home Exercise Program  Education Comment: Continue HEP daily. Educated pt on contractions noted during DC ES and when going through therapeutic interventions.    Plan:  Continue DC ES with plan to re evaluate pulsed ES for progression of facial innervation recovery  OP PT Plan  Treatment/Interventions: Electrical stimulation, Therapeutic exercises  PT Plan: Skilled PT  PT Frequency: 2 times per week  Duration: 8 weeks    Goals:  Active       PT Problem       PT Goal 1 (Progressing)       Start:  06/14/24    Expected End:  08/13/24       Short tem goals to be achieve within 4 weeks:    1. Pt will achieve full left eye closure to avoid eye damaged (Progressing 6/28/24)    Long term goals to be achieved within 8 weeks:    1. Pt. Will achieve full left corner of mouth elevation with smile to improve communication  2. Pt brandan consistently drink out of a cup without spilling the liquid  3.   Pt's stated goal is to gain normal facial control

## 2024-07-05 ENCOUNTER — TREATMENT (OUTPATIENT)
Dept: PHYSICAL THERAPY | Facility: HOSPITAL | Age: 54
End: 2024-07-05
Payer: MEDICAID

## 2024-07-05 DIAGNOSIS — R63.8 DIFFICULTY EATING: ICD-10-CM

## 2024-07-05 DIAGNOSIS — G51.0 FACIAL NERVE PALSY: ICD-10-CM

## 2024-07-05 PROCEDURE — 97110 THERAPEUTIC EXERCISES: CPT | Mod: GP | Performed by: PHYSICAL THERAPIST

## 2024-07-05 PROCEDURE — 97032 APPL MODALITY 1+ESTIM EA 15: CPT | Mod: GP | Performed by: PHYSICAL THERAPIST

## 2024-07-05 ASSESSMENT — PAIN - FUNCTIONAL ASSESSMENT: PAIN_FUNCTIONAL_ASSESSMENT: 0-10

## 2024-07-05 ASSESSMENT — PAIN SCALES - GENERAL: PAINLEVEL_OUTOF10: 0 - NO PAIN

## 2024-07-05 NOTE — PROGRESS NOTES
Physical Therapy    Physical Therapy Treatment    Patient Name: Sharon Reyes  MRN: [id number]  : 1970   Kendrick Purcell  Today's Date: 2024  Time Calculation  Start Time: 945  Stop Time:   Time Calculation (min): 25 min  PT Therapeutic Procedures Time Entry  Therapeutic Exercise Time Entry: 15  PT Modalities Time Entry  E-Stim (Attended) Time Entry: 10        General  General Comment: Visit #  Visit #5     Current Problem  Problem List Items Addressed This Visit             ICD-10-CM    Facial nerve palsy G51.0     Other Visit Diagnoses         Codes    Difficulty eating     R63.8                 Subjective        Pt. Reports compliance with HEP.    Precautions  Precautions  STEADI Fall Risk Score (The score of 4 or more indicates an increased risk of falling): no change  Precautions Comment: RA    Pain  Pain Assessment: 0-10  0-10 (Numeric) Pain Score: 0 - No pain    Objective      Near full left eye closure after treatment today        Treatments:    EXERCISES Date 24 Date 24 Date 24   VISIT # #2 #3 #4    HEP                     Direct attended ES to facial muscles:  Eyebrow, corner of eye, bottom of eye,  Corner of mouth  10'  10' 10'  10'                         Raise eyebrows  2x10  2x10 2x10  2x10   Purse lips  2x10  2x10 2x10  2x10   Close eyes  2x10  2x10 2x10  2x10   Flare nostrils  2x10  2x10 2x10  2x10   Smile 2x10  2x10 2x10  2x10              Patient Education:  Access Code: [email or web address]  URL: [email or web address]  Date: 2024  Prepared by: Omid Torres    Exercises  - Smile  - 3 x daily - 7 x weekly - 10 reps - 2 seconds hold  - Pucker Lips  - 3 x daily - 7 x weekly - 10 reps - 2 seconds hold  - Closing Eyes  - 3 x daily - 7 x weekly - 10 reps - 2 seconds hold  - Eyebrow Raise  - 3 x daily - 7 x weekly - 10 reps - 2 seconds hold  Assessment:   Facial active motion has improved    Plan:  OP PT Plan  Treatment/Interventions: Electrical  stimulation, Therapeutic exercises  PT Plan: Skilled PT  PT Frequency: 2 times per week  Duration: 8 weeks    Goals:  Active       PT Problem       PT Goal 1 (Progressing)       Start:  06/14/24    Expected End:  08/13/24       Short tem goals to be achieve within 4 weeks:    1. Pt will achieve full left eye closure to avoid eye damaged (Progressing 6/28/24)    Long term goals to be achieved within 8 weeks:    1. Pt. Will achieve full left corner of mouth elevation with smile to improve communication  2. Pt brandan consistently drink out of a cup without spilling the liquid  3.   Pt's stated goal is to gain normal facial control

## 2024-07-08 ENCOUNTER — APPOINTMENT (OUTPATIENT)
Dept: PHYSICAL THERAPY | Facility: HOSPITAL | Age: 54
End: 2024-07-08
Payer: MEDICAID

## 2024-07-08 ENCOUNTER — DOCUMENTATION (OUTPATIENT)
Dept: PHYSICAL THERAPY | Facility: HOSPITAL | Age: 54
End: 2024-07-08
Payer: MEDICAID

## 2024-07-08 NOTE — PROGRESS NOTES
Physical Therapy                               Therapy Communication Note    Patient Name: Sharon Reyes  MRN: [id number]  Today's Date: 7/8/2024     Discipline: Physical Therapy    Missed Time: Cancel    Missed Visit Reason:  Pt canceled treatment session due to being busy.

## 2024-07-12 ENCOUNTER — TREATMENT (OUTPATIENT)
Dept: PHYSICAL THERAPY | Facility: HOSPITAL | Age: 54
End: 2024-07-12
Payer: MEDICAID

## 2024-07-12 DIAGNOSIS — R63.8 DIFFICULTY EATING: Primary | ICD-10-CM

## 2024-07-12 DIAGNOSIS — G51.0 FACIAL NERVE PALSY: ICD-10-CM

## 2024-07-12 PROCEDURE — 97032 APPL MODALITY 1+ESTIM EA 15: CPT | Mod: GP,CQ

## 2024-07-12 PROCEDURE — 97110 THERAPEUTIC EXERCISES: CPT | Mod: GP,CQ

## 2024-07-12 ASSESSMENT — PAIN - FUNCTIONAL ASSESSMENT: PAIN_FUNCTIONAL_ASSESSMENT: 0-10

## 2024-07-12 ASSESSMENT — PAIN SCALES - GENERAL: PAINLEVEL_OUTOF10: 0 - NO PAIN

## 2024-07-12 NOTE — PROGRESS NOTES
Physical Therapy    Physical Therapy Treatment    Patient Name: Sharon Reyes  MRN: [id number]  : 1970   Kendrick Purcell  Today's Date: 2024  Time Calculation  Start Time: 1052  Stop Time: 1120  Time Calculation (min): 28 min  PT Therapeutic Procedures Time Entry  Therapeutic Exercise Time Entry: 18  PT Modalities Time Entry  E-Stim (Attended) Time Entry: 10        General     Visit #6     Current Problem  Problem List Items Addressed This Visit             ICD-10-CM    Facial nerve palsy G51.0     Other Visit Diagnoses         Codes    Difficulty eating    -  Primary R63.8                 Subjective      Pt has blurry vision due to eyedrops. She is frustrated and wants to get back to PLOF.    Precautions  Precautions  STEADI Fall Risk Score (The score of 4 or more indicates an increased risk of falling): no change  Precautions Comment: RA    Pain  Pain Assessment: 0-10  0-10 (Numeric) Pain Score: 0 - No pain    Objective       Treatments:    EXERCISES Date 24 Date 24 Date 24 Date 24   VISIT # #2 #3 #4  #6   HEP                        Direct attended ES to facial muscles:  Eyebrow, corner of eye, bottom of eye,  Corner of mouth  10'  10' 10'  10'  10'                           Raise eyebrows  2x10  2x10 2x10  2x10 2x10   Purse lips  2x10  2x10 2x10  2x10 2x10   Close eyes  2x10  2x10 2x10  2x10 2x10   Flare nostrils  2x10  2x10 2x10  2x10 2x10   Smile 2x10  2x10 2x10  2x10 2x10               Patient Education:  Access Code: [email or web address]  URL: [email or web address]  Date: 2024  Prepared by: Omid Torres    Exercises  - Smile  - 3 x daily - 7 x weekly - 10 reps - 2 seconds hold  - Pucker Lips  - 3 x daily - 7 x weekly - 10 reps - 2 seconds hold  - Closing Eyes  - 3 x daily - 7 x weekly - 10 reps - 2 seconds hold  - Eyebrow Raise  - 3 x daily - 7 x weekly - 10 reps - 2 seconds hold  Assessment:   Pt needed estim at corner of lip to be increased closer to 4 as she  could barely feel contraction. Eyebrow reduced to 2 due to sharp feeling today with contact. All other areas remained @ 3. During  electrical stimulation, pt's able to close eye close to 50%. During end of estim, face had increased symmetry. Pt had more movement in eyelids at end of session with conversation than in beginning.     Plan:       Goals:  Active       PT Problem       PT Goal 1 (Progressing)       Start:  06/14/24    Expected End:  08/13/24       Short tem goals to be achieve within 4 weeks:    1. Pt will achieve full left eye closure to avoid eye damaged (Progressing 6/28/24)    Long term goals to be achieved within 8 weeks:    1. Pt. Will achieve full left corner of mouth elevation with smile to improve communication  2. Pt brandan consistently drink out of a cup without spilling the liquid  3.   Pt's stated goal is to gain normal facial control

## 2024-07-16 ENCOUNTER — TREATMENT (OUTPATIENT)
Dept: PHYSICAL THERAPY | Facility: HOSPITAL | Age: 54
End: 2024-07-16
Payer: MEDICAID

## 2024-07-16 DIAGNOSIS — G51.0 FACIAL NERVE PALSY: ICD-10-CM

## 2024-07-16 DIAGNOSIS — R63.8 DIFFICULTY EATING: ICD-10-CM

## 2024-07-16 PROCEDURE — 97110 THERAPEUTIC EXERCISES: CPT | Mod: GP | Performed by: PHYSICAL THERAPIST

## 2024-07-16 PROCEDURE — 97032 APPL MODALITY 1+ESTIM EA 15: CPT | Mod: GP | Performed by: PHYSICAL THERAPIST

## 2024-07-16 ASSESSMENT — PAIN - FUNCTIONAL ASSESSMENT: PAIN_FUNCTIONAL_ASSESSMENT: 0-10

## 2024-07-16 ASSESSMENT — PAIN SCALES - GENERAL: PAINLEVEL_OUTOF10: 0 - NO PAIN

## 2024-07-16 NOTE — PROGRESS NOTES
Physical Therapy    Physical Therapy Treatment    Patient Name: Sharon Reyes  MRN: [id number]  : 1970   Kendrick Purcell  Today's Date: 2024  Time Calculation  Start Time: 1045  Stop Time: 1115  Time Calculation (min): 30 min  PT Therapeutic Procedures Time Entry  Therapeutic Exercise Time Entry: 15  PT Modalities Time Entry  E-Stim (Attended) Time Entry: 15        General  General Comment: Visit #  Visit #7     Current Problem  Problem List Items Addressed This Visit             ICD-10-CM    Facial nerve palsy G51.0     Other Visit Diagnoses         Codes    Difficulty eating     R63.8                 Subjective   Pt. Is now able to drink out of a cup without spilling liquid     Pt. Reports compliance with HEP.    Precautions  Precautions  STEADI Fall Risk Score (The score of 4 or more indicates an increased risk of falling): no change  Precautions Comment: RA    Pain  Pain Assessment: 0-10  0-10 (Numeric) Pain Score: 0 - No pain    Objective      Pt. Is now able to raise left eyebrow through partial ROM        Treatments:    EXERCISES 24 Date 24  Date 24   VISIT #  #6  #7   HEP                  Direct attended ES to facial muscles:  Eyebrow, corner of eye, bottom of eye,  Corner of mouth 10'  10'  15'                     Raise eyebrows  2x10 2x10  2x10   Purse lips  2x10 2x10  2x10   Close eyes  2x10 2x10  2x10   Flare nostrils  2x10 2x10  2x10   Smile  2x10 2x10  2x10             Patient Education:  Access Code: [email or web address]  URL: [email or web address]  Date: 2024  Prepared by: Omid Torres    Exercises  - Smile  - 3 x daily - 7 x weekly - 10 reps - 2 seconds hold  - Pucker Lips  - 3 x daily - 7 x weekly - 10 reps - 2 seconds hold  - Closing Eyes  - 3 x daily - 7 x weekly - 10 reps - 2 seconds hold  - Eyebrow Raise  - 3 x daily - 7 x weekly - 10 reps - 2 seconds hold  Assessment:   Facial control returning    Plan:  OP PT Plan  Treatment/Interventions: Electrical  stimulation, Therapeutic exercises  PT Plan: Skilled PT  PT Frequency: 2 times per week  Duration: 8 weeks    Goals:  Active       PT Problem       PT Goal 1 (Progressing)       Start:  06/14/24    Expected End:  08/13/24       Short tem goals to be achieve within 4 weeks:    1. Pt will achieve full left eye closure to avoid eye damaged (Progressing 6/28/24)    Long term goals to be achieved within 8 weeks:    1. Pt. Will achieve full left corner of mouth elevation with smile to improve communication  2. Pt brandan consistently drink out of a cup without spilling the liquid(Achieved 7/16/24)  3.   Pt's stated goal is to gain normal facial control

## 2024-07-19 ENCOUNTER — APPOINTMENT (OUTPATIENT)
Dept: PHYSICAL THERAPY | Facility: HOSPITAL | Age: 54
End: 2024-07-19
Payer: MEDICAID

## 2024-07-22 ENCOUNTER — TREATMENT (OUTPATIENT)
Dept: PHYSICAL THERAPY | Facility: HOSPITAL | Age: 54
End: 2024-07-22
Payer: MEDICAID

## 2024-07-22 DIAGNOSIS — R63.8 DIFFICULTY EATING: ICD-10-CM

## 2024-07-22 DIAGNOSIS — G51.0 FACIAL NERVE PALSY: Primary | ICD-10-CM

## 2024-07-22 PROCEDURE — 97032 APPL MODALITY 1+ESTIM EA 15: CPT | Mod: GP

## 2024-07-22 PROCEDURE — 97110 THERAPEUTIC EXERCISES: CPT | Mod: GP

## 2024-07-22 ASSESSMENT — PAIN - FUNCTIONAL ASSESSMENT: PAIN_FUNCTIONAL_ASSESSMENT: 0-10

## 2024-07-22 ASSESSMENT — PAIN SCALES - GENERAL: PAINLEVEL_OUTOF10: 0 - NO PAIN

## 2024-07-22 NOTE — PROGRESS NOTES
"Physical Therapy    Physical Therapy Treatment    Patient Name: Sharon Reyes  MRN: [id number]  Today's Date: 7/22/2024  Time Calculation  Start Time: 1006  Stop Time: 1041  Time Calculation (min): 35 min  PT Therapeutic Procedures Time Entry  Therapeutic Exercise Time Entry: 20  PT Modalities Time Entry  E-Stim (Attended) Time Entry: 15  Auth Visit Dates: 6/14/24-6/14/25  VISIT# 8    Current Problem  Problem List Items Addressed This Visit             ICD-10-CM    Facial nerve palsy - Primary G51.0    Difficulty eating R63.8       Subjective   Pt notes nothing new with compliance with HEP daily.      Precautions  Precautions  STEADI Fall Risk Score (The score of 4 or more indicates an increased risk of falling): no change  Precautions Comment: RA       Pain  Pain Assessment: 0-10  0-10 (Numeric) Pain Score: 0 - No pain       Objective   Left eye near full ROM to close eye    Treatments:  EXERCISES 7/5/24 Date 7/12/24  Date 7/16/24 Date 7/22/24   VISIT #  #6  #7 #8   HEP                     Direct attended ES to facial muscles:  Eyebrow, corner of eye, bottom of eye,  Corner of mouth 10'  10'  15' 15'                         Raise eyebrows  2x10 2x10  2x10 3x10   Purse lips  2x10 2x10  2x10 3x10   Close eyes  2x10 2x10  2x10 3x10   Flare nostrils  2x10 2x10  2x10 3x10   Smile  2x10 2x10  2x10 3x10             Patient Education:  Access Code: [email or web address]  URL: [email or web address]  Date: 06/14/2024  Prepared by: Omid Torres  Exercises  - Smile  - 3 x daily - 7 x weekly - 10 reps - 2 seconds hold  - Pucker Lips  - 3 x daily - 7 x weekly - 10 reps - 2 seconds hold  - Closing Eyes  - 3 x daily - 7 x weekly - 10 reps - 2 seconds hold  - Eyebrow Raise  - 3 x daily - 7 x weekly - 10 reps - 2 seconds hold    Assessment:  Pt continues to tolerate treatment well. During DC to corner of mouth, pt reports this is the first date she is feeling it in \"tongue area\" this date.     OP EDUCATION:  Outpatient " Education  Individual(s) Educated: Patient  Education Provided: Anatomy, Body Mechanics  Education Comment: Educated pt throughout session regarding facial muscles during ES.    Plan:  Continue DC ES to improve facial muscle recruitment   OP PT Plan  Treatment/Interventions: Electrical stimulation, Therapeutic exercises  PT Plan: Skilled PT  PT Frequency: 2 times per week  Duration: 8 weeks    Goals:  Active       PT Problem       PT Goal 1 (Progressing)       Start:  06/14/24    Expected End:  08/13/24       Short tem goals to be achieve within 4 weeks:    1. Pt will achieve full left eye closure to avoid eye damaged (Progressing 6/28/24)    Long term goals to be achieved within 8 weeks:    1. Pt. Will achieve full left corner of mouth elevation with smile to improve communication  2. Pt brandan consistently drink out of a cup without spilling the liquid(Achieved 7/16/24)  3.   Pt's stated goal is to gain normal facial control

## 2024-07-25 ENCOUNTER — LAB (OUTPATIENT)
Dept: LAB | Facility: LAB | Age: 54
End: 2024-07-25
Payer: MEDICAID

## 2024-07-25 ENCOUNTER — TELEPHONE (OUTPATIENT)
Dept: PRIMARY CARE | Facility: CLINIC | Age: 54
End: 2024-07-25

## 2024-07-25 ENCOUNTER — APPOINTMENT (OUTPATIENT)
Dept: PRIMARY CARE | Facility: CLINIC | Age: 54
End: 2024-07-25
Payer: MEDICAID

## 2024-07-25 VITALS
DIASTOLIC BLOOD PRESSURE: 82 MMHG | HEIGHT: 66 IN | BODY MASS INDEX: 28.57 KG/M2 | SYSTOLIC BLOOD PRESSURE: 144 MMHG | OXYGEN SATURATION: 100 % | WEIGHT: 177.8 LBS | HEART RATE: 61 BPM

## 2024-07-25 DIAGNOSIS — D50.9 IRON DEFICIENCY ANEMIA, UNSPECIFIED IRON DEFICIENCY ANEMIA TYPE: ICD-10-CM

## 2024-07-25 DIAGNOSIS — R76.0 LUPUS ANTICOAGULANT POSITIVE: ICD-10-CM

## 2024-07-25 DIAGNOSIS — H04.129 DRY EYE: ICD-10-CM

## 2024-07-25 DIAGNOSIS — E03.9 HYPOTHYROIDISM, UNSPECIFIED TYPE: ICD-10-CM

## 2024-07-25 DIAGNOSIS — R73.09 ELEVATED GLUCOSE: ICD-10-CM

## 2024-07-25 DIAGNOSIS — M25.50 ARTHRALGIA OF MULTIPLE JOINTS: ICD-10-CM

## 2024-07-25 DIAGNOSIS — Q74.0 CLINODACTYLY: Primary | ICD-10-CM

## 2024-07-25 DIAGNOSIS — Z98.84 BARIATRIC SURGERY STATUS: ICD-10-CM

## 2024-07-25 DIAGNOSIS — E03.9 HYPOTHYROIDISM, UNSPECIFIED TYPE: Primary | ICD-10-CM

## 2024-07-25 LAB
25(OH)D3 SERPL-MCNC: 29 NG/ML (ref 30–100)
ACANTHOCYTES BLD QL SMEAR: NORMAL
BASOPHILS # BLD AUTO: 0.05 X10*3/UL (ref 0–0.1)
BASOPHILS NFR BLD AUTO: 1.7 %
CRP SERPL-MCNC: <0.1 MG/DL
DACRYOCYTES BLD QL SMEAR: NORMAL
EOSINOPHIL # BLD AUTO: 0.07 X10*3/UL (ref 0–0.7)
EOSINOPHIL NFR BLD AUTO: 2.4 %
ERYTHROCYTE [DISTWIDTH] IN BLOOD BY AUTOMATED COUNT: 24 % (ref 11.5–14.5)
ERYTHROCYTE [SEDIMENTATION RATE] IN BLOOD BY WESTERGREN METHOD: 25 MM/H (ref 0–30)
EST. AVERAGE GLUCOSE BLD GHB EST-MCNC: 120 MG/DL
FERRITIN SERPL-MCNC: 9 NG/ML (ref 8–150)
FOLATE SERPL-MCNC: >22.3 NG/ML
HBA1C MFR BLD: 5.8 %
HCT VFR BLD AUTO: 29.3 % (ref 36–46)
HGB BLD-MCNC: 7.7 G/DL (ref 12–16)
HGB RETIC QN: 18 PG (ref 28–38)
HYPOCHROMIA BLD QL SMEAR: NORMAL
IMM GRANULOCYTES # BLD AUTO: 0 X10*3/UL (ref 0–0.7)
IMM GRANULOCYTES NFR BLD AUTO: 0 % (ref 0–0.9)
IMMATURE RETIC FRACTION: 13.7 %
IRON SATN MFR SERPL: 3 % (ref 25–45)
IRON SERPL-MCNC: 14 UG/DL (ref 35–150)
LYMPHOCYTES # BLD AUTO: 0.82 X10*3/UL (ref 1.2–4.8)
LYMPHOCYTES NFR BLD AUTO: 28.5 %
MCH RBC QN AUTO: 20.2 PG (ref 26–34)
MCHC RBC AUTO-ENTMCNC: 26.3 G/DL (ref 32–36)
MCV RBC AUTO: 77 FL (ref 80–100)
MONOCYTES # BLD AUTO: 0.29 X10*3/UL (ref 0.1–1)
MONOCYTES NFR BLD AUTO: 10.1 %
NEUTROPHILS # BLD AUTO: 1.65 X10*3/UL (ref 1.2–7.7)
NEUTROPHILS NFR BLD AUTO: 57.3 %
NRBC BLD-RTO: 0 /100 WBCS (ref 0–0)
OVALOCYTES BLD QL SMEAR: NORMAL
PLATELET # BLD AUTO: 310 X10*3/UL (ref 150–450)
RBC # BLD AUTO: 3.81 X10*6/UL (ref 4–5.2)
RBC MORPH BLD: NORMAL
RETICS #: 0.05 X10*6/UL (ref 0.02–0.08)
RETICS/RBC NFR AUTO: 1.3 % (ref 0.5–2)
RHEUMATOID FACT SER NEPH-ACNC: <10 IU/ML (ref 0–15)
T4 FREE SERPL-MCNC: 0.77 NG/DL (ref 0.61–1.12)
THYROPEROXIDASE AB SERPL-ACNC: 35 IU/ML
TIBC SERPL-MCNC: 459 UG/DL (ref 240–445)
TRANSFERRIN SERPL-MCNC: 394 MG/DL (ref 200–360)
TSH SERPL-ACNC: 14.61 MIU/L (ref 0.44–3.98)
UIBC SERPL-MCNC: 445 UG/DL (ref 110–370)
VIT B12 SERPL-MCNC: 109 PG/ML (ref 211–911)
WBC # BLD AUTO: 2.9 X10*3/UL (ref 4.4–11.3)

## 2024-07-25 PROCEDURE — 3008F BODY MASS INDEX DOCD: CPT | Performed by: FAMILY MEDICINE

## 2024-07-25 PROCEDURE — 83540 ASSAY OF IRON: CPT

## 2024-07-25 PROCEDURE — 86225 DNA ANTIBODY NATIVE: CPT

## 2024-07-25 PROCEDURE — 84439 ASSAY OF FREE THYROXINE: CPT

## 2024-07-25 PROCEDURE — 84443 ASSAY THYROID STIM HORMONE: CPT

## 2024-07-25 PROCEDURE — 85045 AUTOMATED RETICULOCYTE COUNT: CPT

## 2024-07-25 PROCEDURE — 1036F TOBACCO NON-USER: CPT | Performed by: FAMILY MEDICINE

## 2024-07-25 PROCEDURE — 82746 ASSAY OF FOLIC ACID SERUM: CPT

## 2024-07-25 PROCEDURE — 83036 HEMOGLOBIN GLYCOSYLATED A1C: CPT

## 2024-07-25 PROCEDURE — 99203 OFFICE O/P NEW LOW 30 MIN: CPT | Performed by: FAMILY MEDICINE

## 2024-07-25 PROCEDURE — 86431 RHEUMATOID FACTOR QUANT: CPT

## 2024-07-25 PROCEDURE — 86235 NUCLEAR ANTIGEN ANTIBODY: CPT

## 2024-07-25 PROCEDURE — 36415 COLL VENOUS BLD VENIPUNCTURE: CPT

## 2024-07-25 PROCEDURE — 84466 ASSAY OF TRANSFERRIN: CPT

## 2024-07-25 PROCEDURE — 85613 RUSSELL VIPER VENOM DILUTED: CPT

## 2024-07-25 PROCEDURE — 86376 MICROSOMAL ANTIBODY EACH: CPT

## 2024-07-25 PROCEDURE — 82728 ASSAY OF FERRITIN: CPT

## 2024-07-25 PROCEDURE — 82306 VITAMIN D 25 HYDROXY: CPT

## 2024-07-25 PROCEDURE — 86038 ANTINUCLEAR ANTIBODIES: CPT

## 2024-07-25 PROCEDURE — 85730 THROMBOPLASTIN TIME PARTIAL: CPT

## 2024-07-25 PROCEDURE — 82607 VITAMIN B-12: CPT

## 2024-07-25 PROCEDURE — 85652 RBC SED RATE AUTOMATED: CPT

## 2024-07-25 PROCEDURE — 83550 IRON BINDING TEST: CPT

## 2024-07-25 PROCEDURE — 85025 COMPLETE CBC W/AUTO DIFF WBC: CPT

## 2024-07-25 PROCEDURE — 86140 C-REACTIVE PROTEIN: CPT

## 2024-07-25 RX ORDER — HYPROMELLOSE 3 MG/G
1 GEL OPHTHALMIC 4 TIMES DAILY PRN
Qty: 10 G | Refills: 5 | Status: SHIPPED | OUTPATIENT
Start: 2024-07-25

## 2024-07-25 RX ORDER — LEVOTHYROXINE SODIUM 112 UG/1
112 TABLET ORAL DAILY
Qty: 30 TABLET | Refills: 11 | Status: SHIPPED | OUTPATIENT
Start: 2024-07-25 | End: 2025-07-25

## 2024-07-25 ASSESSMENT — PATIENT HEALTH QUESTIONNAIRE - PHQ9
4. FEELING TIRED OR HAVING LITTLE ENERGY: NEARLY EVERY DAY
SUM OF ALL RESPONSES TO PHQ9 QUESTIONS 1 AND 2: 6
3. TROUBLE FALLING OR STAYING ASLEEP OR SLEEPING TOO MUCH: NEARLY EVERY DAY
SUM OF ALL RESPONSES TO PHQ QUESTIONS 1-9: 18
7. TROUBLE CONCENTRATING ON THINGS, SUCH AS READING THE NEWSPAPER OR WATCHING TELEVISION: NEARLY EVERY DAY
9. THOUGHTS THAT YOU WOULD BE BETTER OFF DEAD, OR OF HURTING YOURSELF: NOT AT ALL
5. POOR APPETITE OR OVEREATING: NEARLY EVERY DAY
10. IF YOU CHECKED OFF ANY PROBLEMS, HOW DIFFICULT HAVE THESE PROBLEMS MADE IT FOR YOU TO DO YOUR WORK, TAKE CARE OF THINGS AT HOME, OR GET ALONG WITH OTHER PEOPLE: EXTREMELY DIFFICULT
2. FEELING DOWN, DEPRESSED OR HOPELESS: NEARLY EVERY DAY
1. LITTLE INTEREST OR PLEASURE IN DOING THINGS: NEARLY EVERY DAY
8. MOVING OR SPEAKING SO SLOWLY THAT OTHER PEOPLE COULD HAVE NOTICED. OR THE OPPOSITE, BEING SO FIGETY OR RESTLESS THAT YOU HAVE BEEN MOVING AROUND A LOT MORE THAN USUAL: NOT AT ALL
6. FEELING BAD ABOUT YOURSELF - OR THAT YOU ARE A FAILURE OR HAVE LET YOURSELF OR YOUR FAMILY DOWN: NOT AT ALL

## 2024-07-25 ASSESSMENT — ENCOUNTER SYMPTOMS
ABDOMINAL PAIN: 0
DIZZINESS: 0
CHILLS: 0
BACK PAIN: 0
DIARRHEA: 0
FATIGUE: 1
ARTHRALGIAS: 0
HEADACHES: 0
MYALGIAS: 0
PALPITATIONS: 0
COUGH: 0
WEAKNESS: 0
NAUSEA: 0
FEVER: 0
CONSTIPATION: 1
VOMITING: 0
DYSPHORIC MOOD: 0
NERVOUS/ANXIOUS: 0
SHORTNESS OF BREATH: 0

## 2024-07-25 NOTE — TELEPHONE ENCOUNTER
----- Message from Annabel Curtis sent at 7/25/2024 12:57 PM EDT -----  Can you call the infusion center here at [place] and ask if they do iron infusions there?

## 2024-07-25 NOTE — RESULT ENCOUNTER NOTE
Your vitamin b and d levels are quite low, I would recommend vitamin b injections for supplement which you can get here. I recommend vitamin d supplement of 2000 units per day. Your anemia has improved with your hemoglobin at 7.7, you have an appointment with hematology but I am still trying to see if we can help you before your appointment. Your thyroid level is very high which means it is function very low, you will need thyroid hormone replacement which is a medication taken once a day on an empty stomach and  you must wait at least an hour before eating anything.  I will send this medication to your pharmacy and we will need to recheck your levels in 2 months, dose adjustments may need to be made. I am still waiting on some of your other lab work to be done, please let me know if you have any questions.

## 2024-07-25 NOTE — PROGRESS NOTES
"Subjective   Patient ID: Sharon Reyes is a 54 y.o. female who presents for New Patient Visit (Iron deficiency, constipation ).    53 y/o female with PMH JOON, bells palsy presents to establish care    Does not have a previous PCP and has been receiving most of her care through emergency room visits    Anemia-reports a history of iron deficiency anemia and in the past had received iron infusions however has not been getting them and is unable to tolerate oral iron supplement due to constipation. Last hemoglobin was 6.8 in ER in May, patient refused transfusion. Does have an appointment with hematology to establish next month.     Has been following with neurology for bells palsy    Reports a history of positive lupus factor     Is not up to date with preventative healt screenings but would like to defer for now.          Review of Systems   Constitutional:  Positive for fatigue. Negative for chills and fever.   Eyes:  Negative for visual disturbance.   Respiratory:  Negative for cough and shortness of breath.    Cardiovascular:  Negative for chest pain and palpitations.   Gastrointestinal:  Positive for constipation. Negative for abdominal pain, diarrhea, nausea and vomiting.   Musculoskeletal:  Negative for arthralgias, back pain and myalgias.   Skin:  Negative for rash.   Neurological:  Negative for dizziness, syncope, weakness and headaches.   Psychiatric/Behavioral:  Negative for dysphoric mood. The patient is not nervous/anxious.        Objective   /82   Pulse 61   Ht 1.676 m (5' 6\")   Wt 80.6 kg (177 lb 12.8 oz)   SpO2 100%   BMI 28.70 kg/m²     Physical Exam  Constitutional:       Appearance: Normal appearance.   HENT:      Head: Normocephalic and atraumatic.   Cardiovascular:      Rate and Rhythm: Normal rate and regular rhythm.      Heart sounds: No murmur heard.     No gallop.   Pulmonary:      Effort: Pulmonary effort is normal. No respiratory distress.      Breath sounds: Normal breath sounds. "   Musculoskeletal:         General: Normal range of motion.   Skin:     General: Skin is warm and dry.      Findings: No lesion or rash.   Neurological:      General: No focal deficit present.      Mental Status: She is alert and oriented to person, place, and time. Mental status is at baseline.   Psychiatric:         Mood and Affect: Mood normal.         Behavior: Behavior normal.         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    Relevant Orders    Referral to Podiatry    Follow Up In [facility] - PCP - Established    Lupus anticoagulant positive     R76.0    Relevant Orders    AMAYA with Reflex to MELVIN    Lupus Anticoagulant With Interpretation [JOSE]    Sedimentation Rate    C-reactive protein    Follow Up In [facility] - PCP - Established    Arthralgia of multiple joints     M25.50    Relevant Orders    AMAYA with Reflex to MELVIN    Rheumatoid factor    Sedimentation Rate    C-reactive protein    Follow Up In [facility] - PCP - Established    Elevated glucose     R73.09    Relevant Orders    Hemoglobin A1C    Follow Up In [facility] - PCP - Established    Bariatric surgery status     Z98.84    Relevant Orders    CBC and Auto Differential    Iron and TIBC    Ferritin    Transferrin    Vitamin B12    Folate    Vitamin D 25-Hydroxy,Total (for eval of Vitamin D levels)    Follow Up In [facility] - PCP - Established    Dry eye     H04.129     Relevant Medications    hypromellose (Systane GeL) 0.3 % opthalmic gel

## 2024-07-26 ENCOUNTER — TELEPHONE (OUTPATIENT)
Dept: PRIMARY CARE | Facility: CLINIC | Age: 54
End: 2024-07-26

## 2024-07-26 ENCOUNTER — TREATMENT (OUTPATIENT)
Dept: PHYSICAL THERAPY | Facility: HOSPITAL | Age: 54
End: 2024-07-26
Payer: MEDICAID

## 2024-07-26 DIAGNOSIS — G51.0 FACIAL NERVE PALSY: ICD-10-CM

## 2024-07-26 DIAGNOSIS — R63.8 DIFFICULTY EATING: ICD-10-CM

## 2024-07-26 PROCEDURE — 97032 APPL MODALITY 1+ESTIM EA 15: CPT | Mod: GP | Performed by: PHYSICAL THERAPIST

## 2024-07-26 PROCEDURE — 97110 THERAPEUTIC EXERCISES: CPT | Mod: GP | Performed by: PHYSICAL THERAPIST

## 2024-07-26 ASSESSMENT — PAIN - FUNCTIONAL ASSESSMENT: PAIN_FUNCTIONAL_ASSESSMENT: 0-10

## 2024-07-26 ASSESSMENT — PAIN SCALES - GENERAL: PAINLEVEL_OUTOF10: 0 - NO PAIN

## 2024-07-26 NOTE — TELEPHONE ENCOUNTER
----- Message from Annabel Curtis sent at 7/25/2024  5:18 PM EDT -----  Your vitamin b and d levels are quite low, I would recommend vitamin b injections for supplement which you can get here. I recommend vitamin d supplement of 2000 units per day. Your anemia has improved with your hemoglobin at 7.7, you have an appointment with hematology but I am still trying to see if we can help you before your appointment. Your thyroid level is very high which means it is function very low, you will need thyroid hormone replacement which is a medication taken once a day on an empty stomach and  you must wait at least an hour before eating anything.  I will send this medication to your pharmacy and we will need to recheck your levels in 2 months, dose adjustments may need to be made. I am still waiting on some of your other lab work to be done, please let me know if you have any questions.

## 2024-07-26 NOTE — PROGRESS NOTES
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 Reports compliance with HEP.    Precautions  Precautions  STEADI Fall Risk Score (The score of 4 or more indicates an increased risk of falling): No change  Precautions Comment: RA    Pain  Pain Assessment: 0-10  0-10 (Numeric) Pain Score: 0 - No pain    Objective      AC ES was trialed today to help determine if the facial muscles have yet been reinervated.  There is no muscular response to AC ES today    Incomplete closure of left eye continues.  Right deviation of lips with pursed lip position continues    Good facial muscle contraction is achieved with DC ES today    Treatments:  EXERCISES  Date 24 Date 24  Date 24   VISIT #  #7 #8  #9   HEP                  Direct attended ES to facial muscles:  Eyebrow, corner of eye, bottom of eye,  Corner of mouth  15' 15'   15'                     Raise eyebrows  2x10 3x10  3x10   Purse lips  2x10 3x10  3x10   Close eyes  2x10 3x10 3x10   Flare nostrils  2x10 3x10 3x10   Smile  2x10 3x10  3x10           Patient Education:  Access Code: [email or web address]  URL: [email or web address]  Date: 2024  Prepared by: Omid Torres  Exercises  - Smile  - 3 x daily - 7 x weekly - 10 reps - 2 seconds hold  - Pucker Lips  - 3 x daily - 7 x weekly - 10 reps - 2 seconds hold  - Closing Eyes  - " 3 x daily - 7 x weekly - 10 reps - 2 seconds hold  - Eyebrow Raise  - 3 x daily - 7 x weekly - 10 reps - 2 seconds hold    Assessment:   Facial muscles are still denervated as indicated by no muscular response to AC ES    Plan:  OP PT Plan  Treatment/Interventions: Electrical stimulation, Therapeutic exercises  PT Plan: Skilled PT  PT Frequency: 2 times per week  Duration: 8 weeks    Goals:  Active       PT Problem       PT Goal 1 (Progressing)       Start:  06/14/24    Expected End:  08/13/24       Short tem goals to be achieve within 4 weeks:    1. Pt will achieve full left eye closure to avoid eye damaged (Progressing 6/28/24)    Long term goals to be achieved within 8 weeks:    1. Pt. Will achieve full left corner of mouth elevation with smile to improve communication  2. Pt brandan consistently drink out of a cup without spilling the liquid(Achieved 7/16/24)  3.   Pt's stated goal is to gain normal facial control

## 2024-07-29 ENCOUNTER — DOCUMENTATION (OUTPATIENT)
Dept: PHYSICAL THERAPY | Facility: HOSPITAL | Age: 54
End: 2024-07-29
Payer: MEDICAID

## 2024-07-29 DIAGNOSIS — R63.8 DIFFICULTY EATING: ICD-10-CM

## 2024-07-29 DIAGNOSIS — G51.0 FACIAL NERVE PALSY: Primary | ICD-10-CM

## 2024-07-29 LAB
ANA PATTERN: ABNORMAL
ANA SER QL HEP2 SUBST: POSITIVE
ANA TITR SER IF: ABNORMAL {TITER}
CENTROMERE B AB SER-ACNC: <0.2 AI
CHROMATIN AB SERPL-ACNC: <0.2 AI
DRVVT SCREEN TO CONFIRM RATIO: 0.72 RATIO
DRVVT/DRVVT CFM NRMLZD PPP-RTO: 0.89 RATIO
DRVVT/DRVVT CFM P DOAC NEUT NORM PPP-RTO: 0.8 RATIO
DSDNA AB SER-ACNC: <1 IU/ML
ENA JO1 AB SER QL IA: <0.2 AI
ENA RNP AB SER IA-ACNC: 7.7 AI
ENA SCL70 AB SER QL IA: <0.2 AI
ENA SM AB SER IA-ACNC: <0.2 AI
ENA SM+RNP AB SER QL IA: <0.2 AI
ENA SS-A AB SER IA-ACNC: <0.2 AI
ENA SS-B AB SER IA-ACNC: <0.2 AI
LA 2 SCREEN W REFLEX-IMP: NORMAL
NORMALIZED SCT PPP-RTO: 0.8 RATIO
RIBOSOMAL P AB SER-ACNC: <0.2 AI
SILICA CLOTTING TIME CONFIRMATION: 1.25 RATIO
SILICA CLOTTING TIME SCREEN: 1 RATIO

## 2024-07-29 NOTE — PROGRESS NOTES
Physical Therapy                 Therapy Communication Note    Patient Name: Sharon Reyes  MRN: [id number]  Today's Date: 7/29/2024     Discipline: Physical Therapy    Missed Time: No Show    Missed Visit Reason: This PT called and left voicemail reminding pt of appointment scheduled for tomorrow, 7/30, at 10:45AM.

## 2024-07-30 ENCOUNTER — TREATMENT (OUTPATIENT)
Dept: PHYSICAL THERAPY | Facility: HOSPITAL | Age: 54
End: 2024-07-30
Payer: MEDICAID

## 2024-07-30 DIAGNOSIS — R63.8 DIFFICULTY EATING: ICD-10-CM

## 2024-07-30 DIAGNOSIS — G51.0 FACIAL NERVE PALSY: ICD-10-CM

## 2024-07-30 PROCEDURE — 97110 THERAPEUTIC EXERCISES: CPT | Mod: GP | Performed by: PHYSICAL THERAPIST

## 2024-07-30 PROCEDURE — 97032 APPL MODALITY 1+ESTIM EA 15: CPT | Mod: GP | Performed by: PHYSICAL THERAPIST

## 2024-07-30 ASSESSMENT — PAIN - FUNCTIONAL ASSESSMENT: PAIN_FUNCTIONAL_ASSESSMENT: 0-10

## 2024-07-30 ASSESSMENT — PAIN SCALES - GENERAL: PAINLEVEL_OUTOF10: 0 - NO PAIN

## 2024-07-30 NOTE — PROGRESS NOTES
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 Reports that , due to decreased facial muscle control, she continues to have a difficult time maneuvering food in her mouth when attempting to chew food on the left side   Pt.'s name and  were confirmed this date.    Pt. Reports compliance with HEP.    Precautions  Precautions  STEADI Fall Risk Score (The score of 4 or more indicates an increased risk of falling): no change  Precautions Comment: RA    Pain  Pain Assessment: 0-10  0-10 (Numeric) Pain Score: 0 - No pain    Objective      Pt. Shows incomplete closure of left eye but does show active contraction of orbicularis oculi.  She is now showing active muscle contraction when attempting to smile.  When pursing lips, she does have deviation to the right        Treatments:    EXERCISES  Date 24 Date 24  Date 24 Date 24   VISIT #  #7 #8  #9  #10   HEP                     Direct attended ES to facial muscles:  Eyebrow, corner of eye, bottom of eye,  Corner of mouth  15' 15'   15'  15'                        Raise eyebrows  2x10 3x10  3x10  3x10   Purse lips  2x10 3x10  3x10  3x10   Close eyes  2x10 3x10 3x10  3x10   Flare nostrils  2x10 3x10 3x10  3x10   Smile  2x10 3x10  3x10  3x10            Patient Education:  Access Code: [email or web address]  URL: [email or web address]  Date: 2024  Prepared by: Omid Torres  Exercises  - Smile  - 3 x daily - 7 x  weekly - 10 reps - 2 seconds hold  - Pucker Lips  - 3 x daily - 7 x weekly - 10 reps - 2 seconds hold  - Closing Eyes  - 3 x daily - 7 x weekly - 10 reps - 2 seconds hold  - Eyebrow Raise  - 3 x daily - 7 x weekly - 10 reps - 2 seconds hold    Assessment:   Facial muscle control showing improvement this date    Plan:  OP PT Plan  Treatment/Interventions: Electrical stimulation, Therapeutic exercises  PT Plan: Skilled PT  PT Frequency: 2 times per week  Duration: 8 weeks    Goals:  Active       PT Problem       PT Goal 1 (Progressing)       Start:  06/14/24    Expected End:  08/13/24       Short tem goals to be achieve within 4 weeks:    1. Pt will achieve full left eye closure to avoid eye damaged (Progressing 7/30/24)    Long term goals to be achieved within 8 weeks:    1. Pt. Will achieve full left corner of mouth elevation with smile to improve communication (Progressing 7/30/24  2. Pt brandan consistently drink out of a cup without spilling the liquid(Achieved 7/16/24)  3.   Pt's stated goal is to gain normal facial control

## 2024-08-01 ENCOUNTER — TELEPHONE (OUTPATIENT)
Dept: PRIMARY CARE | Facility: CLINIC | Age: 54
End: 2024-08-01
Payer: MEDICAID

## 2024-08-01 NOTE — RESULT ENCOUNTER NOTE
You have a positive AMAYA with positive Anti-RNP which is concern for connective tissue disease. Your lupus testing was negative, I would recommend a referral to rheumatology for the positive testing.

## 2024-08-01 NOTE — TELEPHONE ENCOUNTER
----- Message from Annabel Curtis sent at 8/1/2024 10:46 AM EDT -----  You have a positive AMAYA with positive Anti-RNP which is concern for connective tissue disease. Your lupus testing was negative, I would recommend a referral to rheumatology for the positive testing.

## 2024-08-07 ENCOUNTER — TREATMENT (OUTPATIENT)
Dept: PHYSICAL THERAPY | Facility: HOSPITAL | Age: 54
End: 2024-08-07
Payer: MEDICAID

## 2024-08-07 DIAGNOSIS — G51.0 FACIAL NERVE PALSY: Primary | ICD-10-CM

## 2024-08-07 DIAGNOSIS — R63.8 DIFFICULTY EATING: ICD-10-CM

## 2024-08-07 PROCEDURE — 97032 APPL MODALITY 1+ESTIM EA 15: CPT | Mod: GP

## 2024-08-07 PROCEDURE — 97110 THERAPEUTIC EXERCISES: CPT | Mod: GP

## 2024-08-07 ASSESSMENT — PAIN - FUNCTIONAL ASSESSMENT: PAIN_FUNCTIONAL_ASSESSMENT: 0-10

## 2024-08-07 ASSESSMENT — PAIN SCALES - GENERAL: PAINLEVEL_OUTOF10: 0 - NO PAIN

## 2024-08-07 NOTE — PROGRESS NOTES
"Physical Therapy    Physical Therapy Treatment    Patient Name: Sharon Reyes  MRN: [id number]  Today's Date: 8/7/2024  Time Calculation  Start Time: 1521  Stop Time: 1600  Time Calculation (min): 39 min  PT Therapeutic Procedures Time Entry  Therapeutic Exercise Time Entry: 24  PT Modalities Time Entry  E-Stim (Attended) Time Entry: 15  Auth Visit Dates: 6/14/24-6/14/25  VISIT# 11    Current Problem  Problem List Items Addressed This Visit             ICD-10-CM    Facial nerve palsy - Primary G51.0    Difficulty eating R63.8       Subjective   Pt reports she has burning in her L eye this a date that is \"driving her crazy\". Per pt, she has noticed muscle twitching in her L cheek and under L eye over the last week.      Precautions  Precautions  STEADI Fall Risk Score (The score of 4 or more indicates an increased risk of falling): no change  Precautions Comment: RA       Pain  Pain Assessment: 0-10  0-10 (Numeric) Pain Score: 0 - No pain       Objective   Continuing with previous POC to continue promoting facial muscle activation    Treatments:  EXERCISES Date 7/22/24  Date 7/26/24 Date 7/30/24 Date 8/7/24   VISIT # #8  #9  #10 #11   HEP                     Direct attended ES to facial muscles:  Eyebrow, corner of eye, bottom of eye,  Corner of mouth 15'   15'  15' 15'                        Raise eyebrows 3x10  3x10  3x10 3x10    Purse lips 3x10  3x10  3x10 3x10   Close eyes 3x10 3x10  3x10 3x10   Flare nostrils 3x10 3x10  3x10 3x10   Smile 3x10  3x10  3x10 3x10            Patient Education:  Access Code: [email or web address]  URL: [email or web address]  Date: 06/14/2024  Prepared by: Omid Torres  Exercises  - Smile  - 3 x daily - 7 x weekly - 10 reps - 2 seconds hold  - Pucker Lips  - 3 x daily - 7 x weekly - 10 reps - 2 seconds hold  - Closing Eyes  - 3 x daily - 7 x weekly - 10 reps - 2 seconds hold  - Eyebrow Raise  - 3 x daily - 7 x weekly - 10 reps - 2 seconds hold    Assessment:  Increased L " buccinator and orbicularis oris activation noted during pursed lips. Pt reports L eye burning throughout session without relief from eye drops pt brought into session.     OP EDUCATION:  Outpatient Education  Individual(s) Educated: Patient  Education Provided: [employer]ics  Education Comment: Educated pt throughout session regarding facial muscles during ES.    Plan:  OP PT Plan  Treatment/Interventions: Electrical stimulation, Therapeutic exercises  PT Plan: Skilled PT  PT Frequency: 2 times per week  Duration: 8 weeks    Goals:  Active       PT Problem       PT Goal 1 (Progressing)       Start:  06/14/24    Expected End:  08/13/24       Short tem goals to be achieve within 4 weeks:    1. Pt will achieve full left eye closure to avoid eye damaged (Progressing 7/30/24)    Long term goals to be achieved within 8 weeks:    1. Pt. Will achieve full left corner of mouth elevation with smile to improve communication (Progressing 7/30/24  2. Pt brandan consistently drink out of a cup without spilling the liquid(Achieved 7/16/24)  3.   Pt's stated goal is to gain normal facial control

## 2024-08-09 ENCOUNTER — TREATMENT (OUTPATIENT)
Dept: PHYSICAL THERAPY | Facility: HOSPITAL | Age: 54
End: 2024-08-09
Payer: MEDICAID

## 2024-08-09 DIAGNOSIS — R63.8 DIFFICULTY EATING: ICD-10-CM

## 2024-08-09 DIAGNOSIS — G51.0 FACIAL NERVE PALSY: ICD-10-CM

## 2024-08-09 PROCEDURE — 97032 APPL MODALITY 1+ESTIM EA 15: CPT | Mod: GP | Performed by: PHYSICAL THERAPIST

## 2024-08-09 PROCEDURE — 97110 THERAPEUTIC EXERCISES: CPT | Mod: GP | Performed by: PHYSICAL THERAPIST

## 2024-08-09 ASSESSMENT — PAIN SCALES - GENERAL: PAINLEVEL_OUTOF10: 0 - NO PAIN

## 2024-08-09 ASSESSMENT — PAIN - FUNCTIONAL ASSESSMENT: PAIN_FUNCTIONAL_ASSESSMENT: 0-10

## 2024-08-09 NOTE — PROGRESS NOTES
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 Reports that she is beginning to notice improvement with active eye movement as well as with smiling   Pt.'s name and  were confirmed this date.    Pt. Reports compliance with HEP.    Precautions  Precautions  STEADI Fall Risk Score (The score of 4 or more indicates an increased risk of falling): no charge  Precautions Comment: RA    Pain  Pain Assessment: 0-10  0-10 (Numeric) Pain Score: 0 - No pain    Objective      Pt. Is showing a small amount of active control of smiling, pursing lips, closing eye and raising eyebrows.  However, she has incomplete movement with all these motions.        Treatments:    EXERCISES  Date 24 Date 24 Date 24   VISIT #  #9  #10 #11 #12   HEP                     Direct attended ES to facial muscles:  Eyebrow, corner of eye, bottom of eye,  Corner of mouth  15'  15' 15' 15'                        Raise eyebrows  3x10  3x10 3x10   3x10    Purse lips  3x10  3x10 3x10  3x10    Close eyes 3x10  3x10 3x10  3x10    Flare nostrils 3x10  3x10 3x10  3x10    Smile 3x10  3x10 3x10  3x10               Patient Education:  Access Code: [email or web address]  URL: [email or web address]  Date: 2024  Prepared by: Omid Torres    Exercises  - Smile  - 3 x daily - 7 x weekly - 10 reps - 2 seconds hold  - Pucker Lips  - 3 x daily - 7 x weekly - 10 reps - 2 seconds hold  - Closing Eyes  - 3 x  daily - 7 x weekly - 10 reps - 2 seconds hold  - Eyebrow Raise  - 3 x daily - 7 x weekly - 10 reps - 2 seconds hold  Assessment:   Pt. Is showing slow progress and is ind with HEP.  She will continue with HEP and will be discharged from PT at this point    Plan:  Discharge    Goals:  Active       PT Problem       PT Goal 1 (Progressing)       Start:  06/14/24    Expected End:  08/13/24       Short tem goals to be achieve within 4 weeks:    1. Pt will achieve full left eye closure to avoid eye damaged (Progressing 7/30/24)    Long term goals to be achieved within 8 weeks:    1. Pt. Will achieve full left corner of mouth elevation with smile to improve communication (Progressing 7/30/24  2. Pt brandan consistently drink out of a cup without spilling the liquid(Achieved 7/16/24)  3.   Pt's stated goal is to gain normal facial control

## 2024-08-13 ENCOUNTER — APPOINTMENT (OUTPATIENT)
Dept: PHYSICAL THERAPY | Facility: HOSPITAL | Age: 54
End: 2024-08-13
Payer: MEDICAID

## 2024-08-16 ENCOUNTER — APPOINTMENT (OUTPATIENT)
Dept: PHYSICAL THERAPY | Facility: HOSPITAL | Age: 54
End: 2024-08-16
Payer: MEDICAID

## 2024-08-20 ENCOUNTER — HOSPITAL ENCOUNTER (OUTPATIENT)
Dept: RADIOLOGY | Facility: CLINIC | Age: 54
Discharge: HOME | End: 2024-08-20
Payer: MEDICAID

## 2024-08-20 DIAGNOSIS — M25.872 OTHER SPECIFIED JOINT DISORDERS, LEFT ANKLE AND FOOT: ICD-10-CM

## 2024-08-20 DIAGNOSIS — M20.11 HALLUX VALGUS (ACQUIRED), RIGHT FOOT: ICD-10-CM

## 2024-08-20 PROCEDURE — 73630 X-RAY EXAM OF FOOT: CPT | Mod: 50

## 2024-08-20 PROCEDURE — 73630 X-RAY EXAM OF FOOT: CPT | Mod: BILATERAL PROCEDURE | Performed by: RADIOLOGY

## 2024-08-21 ENCOUNTER — TELEPHONE (OUTPATIENT)
Dept: PRIMARY CARE | Facility: CLINIC | Age: 54
End: 2024-08-21
Payer: MEDICAID

## 2024-08-21 ENCOUNTER — APPOINTMENT (OUTPATIENT)
Dept: HEMATOLOGY/ONCOLOGY | Facility: CLINIC | Age: 54
End: 2024-08-21
Payer: MEDICAID

## 2024-08-21 DIAGNOSIS — D50.0 IRON DEFICIENCY ANEMIA DUE TO CHRONIC BLOOD LOSS: Primary | ICD-10-CM

## 2024-08-21 RX ORDER — HEPARIN SODIUM,PORCINE/PF 10 UNIT/ML
50 SYRINGE (ML) INTRAVENOUS AS NEEDED
OUTPATIENT
Start: 2024-08-22

## 2024-08-21 RX ORDER — HEPARIN 100 UNIT/ML
500 SYRINGE INTRAVENOUS AS NEEDED
OUTPATIENT
Start: 2024-08-22

## 2024-08-21 RX ORDER — EPINEPHRINE 0.3 MG/.3ML
0.3 INJECTION SUBCUTANEOUS EVERY 5 MIN PRN
OUTPATIENT
Start: 2024-08-22

## 2024-08-21 RX ORDER — FAMOTIDINE 10 MG/ML
20 INJECTION INTRAVENOUS ONCE AS NEEDED
OUTPATIENT
Start: 2024-08-22

## 2024-08-21 RX ORDER — DIPHENHYDRAMINE HYDROCHLORIDE 50 MG/ML
50 INJECTION INTRAMUSCULAR; INTRAVENOUS AS NEEDED
OUTPATIENT
Start: 2024-08-22

## 2024-08-21 RX ORDER — ALBUTEROL SULFATE 0.83 MG/ML
3 SOLUTION RESPIRATORY (INHALATION) AS NEEDED
OUTPATIENT
Start: 2024-08-22

## 2024-08-21 NOTE — TELEPHONE ENCOUNTER
Sharon is scheduled for [facility] therapy tomorrow and next Thursday. Since Annabel left they need new orders.   Please call Lola with any questions.  She doesn't want to cancel Sharon's appointment if it can be avoided.

## 2024-08-22 ENCOUNTER — APPOINTMENT (OUTPATIENT)
Dept: INFUSION THERAPY | Facility: HOSPITAL | Age: 54
End: 2024-08-22
Payer: MEDICAID

## 2024-08-22 DIAGNOSIS — D50.8 OTHER IRON DEFICIENCY ANEMIA: Primary | ICD-10-CM

## 2024-08-26 ENCOUNTER — LAB (OUTPATIENT)
Dept: LAB | Facility: LAB | Age: 54
End: 2024-08-26
Payer: MEDICAID

## 2024-08-26 ENCOUNTER — APPOINTMENT (OUTPATIENT)
Dept: PRIMARY CARE | Facility: CLINIC | Age: 54
End: 2024-08-26
Payer: MEDICAID

## 2024-08-26 VITALS
SYSTOLIC BLOOD PRESSURE: 118 MMHG | HEIGHT: 66 IN | OXYGEN SATURATION: 100 % | WEIGHT: 172 LBS | HEART RATE: 70 BPM | DIASTOLIC BLOOD PRESSURE: 70 MMHG | BODY MASS INDEX: 27.64 KG/M2

## 2024-08-26 DIAGNOSIS — E03.9 ACQUIRED HYPOTHYROIDISM: ICD-10-CM

## 2024-08-26 DIAGNOSIS — D50.0 IRON DEFICIENCY ANEMIA DUE TO CHRONIC BLOOD LOSS: ICD-10-CM

## 2024-08-26 DIAGNOSIS — E03.9 ACQUIRED HYPOTHYROIDISM: Primary | ICD-10-CM

## 2024-08-26 DIAGNOSIS — M25.50 ARTHRALGIA OF MULTIPLE JOINTS: ICD-10-CM

## 2024-08-26 DIAGNOSIS — Q74.0 CLINODACTYLY: ICD-10-CM

## 2024-08-26 DIAGNOSIS — D50.9 IRON DEFICIENCY ANEMIA, UNSPECIFIED IRON DEFICIENCY ANEMIA TYPE: ICD-10-CM

## 2024-08-26 DIAGNOSIS — E53.8 CYANOCOBALAMIN DEFICIENCY: Primary | ICD-10-CM

## 2024-08-26 DIAGNOSIS — E55.9 VITAMIN D DEFICIENCY: ICD-10-CM

## 2024-08-26 DIAGNOSIS — R76.0 LUPUS ANTICOAGULANT POSITIVE: ICD-10-CM

## 2024-08-26 DIAGNOSIS — S49.91XD INJURY OF RIGHT SHOULDER, SUBSEQUENT ENCOUNTER: ICD-10-CM

## 2024-08-26 DIAGNOSIS — R73.09 ELEVATED GLUCOSE: ICD-10-CM

## 2024-08-26 DIAGNOSIS — Z98.84 BARIATRIC SURGERY STATUS: ICD-10-CM

## 2024-08-26 LAB
B-HCG SERPL-ACNC: <2 MIU/ML
ERYTHROCYTE [DISTWIDTH] IN BLOOD BY AUTOMATED COUNT: 20.6 % (ref 11.5–14.5)
FERRITIN SERPL-MCNC: 55 NG/ML (ref 8–150)
HCT VFR BLD AUTO: 29.3 % (ref 36–46)
HGB BLD-MCNC: 7.9 G/DL (ref 12–16)
IRON SATN MFR SERPL: 4 % (ref 25–45)
IRON SERPL-MCNC: 19 UG/DL (ref 35–150)
MCH RBC QN AUTO: 21.1 PG (ref 26–34)
MCHC RBC AUTO-ENTMCNC: 27 G/DL (ref 32–36)
MCV RBC AUTO: 78 FL (ref 80–100)
NRBC BLD-RTO: 0 /100 WBCS (ref 0–0)
PLATELET # BLD AUTO: 329 X10*3/UL (ref 150–450)
RBC # BLD AUTO: 3.74 X10*6/UL (ref 4–5.2)
T4 FREE SERPL-MCNC: 1.48 NG/DL (ref 0.61–1.12)
TIBC SERPL-MCNC: 454 UG/DL (ref 240–445)
TSH SERPL-ACNC: 0.17 MIU/L (ref 0.44–3.98)
UIBC SERPL-MCNC: 435 UG/DL (ref 110–370)
WBC # BLD AUTO: 5.2 X10*3/UL (ref 4.4–11.3)

## 2024-08-26 PROCEDURE — 83540 ASSAY OF IRON: CPT

## 2024-08-26 PROCEDURE — 84439 ASSAY OF FREE THYROXINE: CPT

## 2024-08-26 PROCEDURE — 36415 COLL VENOUS BLD VENIPUNCTURE: CPT

## 2024-08-26 PROCEDURE — 82728 ASSAY OF FERRITIN: CPT

## 2024-08-26 PROCEDURE — 96372 THER/PROPH/DIAG INJ SC/IM: CPT

## 2024-08-26 PROCEDURE — 84702 CHORIONIC GONADOTROPIN TEST: CPT

## 2024-08-26 PROCEDURE — 83550 IRON BINDING TEST: CPT

## 2024-08-26 PROCEDURE — 84443 ASSAY THYROID STIM HORMONE: CPT

## 2024-08-26 PROCEDURE — 3008F BODY MASS INDEX DOCD: CPT

## 2024-08-26 PROCEDURE — 1036F TOBACCO NON-USER: CPT

## 2024-08-26 PROCEDURE — 85027 COMPLETE CBC AUTOMATED: CPT

## 2024-08-26 PROCEDURE — 99214 OFFICE O/P EST MOD 30 MIN: CPT

## 2024-08-26 RX ORDER — ERGOCALCIFEROL 1.25 MG/1
50000 CAPSULE ORAL
Qty: 12 CAPSULE | Refills: 0 | Status: SHIPPED | OUTPATIENT
Start: 2024-09-01 | End: 2024-11-24

## 2024-08-26 RX ORDER — CYANOCOBALAMIN 1000 UG/ML
1000 INJECTION, SOLUTION INTRAMUSCULAR; SUBCUTANEOUS ONCE
Status: COMPLETED | OUTPATIENT
Start: 2024-08-26 | End: 2024-08-26

## 2024-08-26 RX ORDER — LEVOTHYROXINE SODIUM 88 UG/1
88 TABLET ORAL DAILY
Qty: 30 TABLET | Refills: 11 | Status: SHIPPED | OUTPATIENT
Start: 2024-08-26 | End: 2025-08-26

## 2024-08-26 ASSESSMENT — ENCOUNTER SYMPTOMS
GASTROINTESTINAL NEGATIVE: 1
LOSS OF SENSATION IN FEET: 0
CARDIOVASCULAR NEGATIVE: 1
CONSTITUTIONAL NEGATIVE: 1
EYES NEGATIVE: 1
HEMATOLOGIC/LYMPHATIC NEGATIVE: 1
DEPRESSION: 0
ALLERGIC/IMMUNOLOGIC NEGATIVE: 1
PSYCHIATRIC NEGATIVE: 1
ENDOCRINE NEGATIVE: 1
NEUROLOGICAL NEGATIVE: 1
MUSCULOSKELETAL NEGATIVE: 1
RESPIRATORY NEGATIVE: 1
OCCASIONAL FEELINGS OF UNSTEADINESS: 0

## 2024-08-26 ASSESSMENT — PATIENT HEALTH QUESTIONNAIRE - PHQ9
SUM OF ALL RESPONSES TO PHQ9 QUESTIONS 1 AND 2: 0
1. LITTLE INTEREST OR PLEASURE IN DOING THINGS: NOT AT ALL
2. FEELING DOWN, DEPRESSED OR HOPELESS: NOT AT ALL

## 2024-08-26 NOTE — RESULT ENCOUNTER NOTE
I am decreasing your thyroid medication, to get back to a normal range.  The medication is working just making it a little too hyperactive.  We will recheck levels at next appointment.

## 2024-08-26 NOTE — PROGRESS NOTES
"Subjective   Patient ID: Sharon Reyes is a 54 y.o. female who presents for Follow-up (1 month follow up, b12 shot, wondering how much vitamin d to take.).    Past Medical, Surgical, and Family History reviewed and updated in chart.     Reviewed all medications by prescribing practitioner or clinical pharmacist (such as prescriptions, OTCs, herbal therapies and supplements) and documented in the medical record.    HPI   Patient in for one month follow up.   Right shoulder pain, has history of right shoulder injury, had MRI done in 2022:1.  Full-thickness supraspinatus and infraspinatus tendon tears, with retraction of the tendon stumps to at least the level of the bony glenoid (approximately 5 cm).   2.  Partial-thickness tear and tendinopathy of the superior 1/2 subscapularis tendon.   3.  Large glenohumeral joint effusion.   No surgical intervention, Having increase weakness and loss of ROM.   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  Would like to start b12 injections, last b12 level was 109. And would like oral vitamin D ordered.   Iron infusion have been set up, patient has not started yet.     Review of Systems   Constitutional: Negative.    HENT: Negative.     Eyes: Negative.    Respiratory: Negative.     Cardiovascular: Negative.    Gastrointestinal: Negative.    Endocrine: Negative.    Genitourinary: Negative.    Musculoskeletal: Negative.         See HPI   Skin: Negative.    Allergic/Immunologic: Negative.    Neurological: Negative.    Hematological: Negative.    Psychiatric/Behavioral: Negative.     All other systems reviewed and are negative.      Objective   /70   Pulse 70   Ht 1.676 m (5' 5.98\")   Wt 78 kg (172 lb)   SpO2 100%   BMI 27.77 kg/m²     Physical Exam  Constitutional:       Appearance: Normal appearance.   HENT:      Head: Normocephalic and atraumatic.   Cardiovascular:      Rate and Rhythm: Normal rate and regular rhythm.      Pulses: Normal " pulses.      Heart sounds: Normal heart sounds.   Pulmonary:      Effort: Pulmonary effort is normal.      Breath sounds: Normal breath sounds.   Musculoskeletal:      Right shoulder: Decreased range of motion. Decreased strength.      Cervical back: Normal range of motion.   Skin:     General: Skin is warm and dry.   Neurological:      General: No focal deficit present.      Mental Status: She is alert and oriented to person, place, and time.   Psychiatric:         Mood and Affect: Mood normal.         Behavior: Behavior normal.         Assessment/Plan   Problem List Items Addressed This Visit       Anemia     Other Visit Diagnoses       Cyanocobalamin deficiency    -  Primary    Relevant Medications    cyanocobalamin (Vitamin B-12) injection 1,000 mcg (Completed)    Other Relevant Orders    Follow Up In [facility] - PCP - Nurse Visit    Follow Up In [facility] - PCP - Nurse Visit    Follow Up In [facility] - PCP - Nurse Visit    Clinodactyly        Lupus anticoagulant positive        Arthralgia of multiple joints        Elevated glucose        Bariatric surgery status        Vitamin D deficiency        Relevant Medications    ergocalciferol (Vitamin D-2) 1.25 MG (78592 UT) capsule (Start on 9/1/2024)    Injury of right shoulder, subsequent encounter        Relevant Orders    Referral to Physical Therapy    Referral to Orthopaedic Surgery    Acquired hypothyroidism        Relevant Orders    TSH with reflex to Free T4 if abnormal

## 2024-08-26 NOTE — PATIENT INSTRUCTIONS
Assessment/Plan   Problem List Items Addressed This Visit       Anemia     Other Visit Diagnoses       Cyanocobalamin deficiency    -  Primary    Relevant Medications    cyanocobalamin (Vitamin B-12) injection 1,000 mcg (Start on 8/26/2024  9:30 AM)    Other Relevant Orders    Follow Up In [facility] - PCP - Nurse Visit    Follow Up In [facility] - PCP - Nurse Visit    Follow Up In [facility] - PCP - Nurse Visit    Clinodactyly        Lupus anticoagulant positive        Arthralgia of multiple joints        Elevated glucose        Bariatric surgery status        Vitamin D deficiency        Relevant Medications    ergocalciferol (Vitamin D-2) 1.25 MG (45502 UT) capsule (Start on 9/1/2024)    Injury of right shoulder, subsequent encounter        Relevant Orders    Referral to Physical Therapy    Referral to Orthopaedic Surgery    Acquired hypothyroidism        Relevant Orders    TSH with reflex to Free T4 if abnormal

## 2024-08-27 ENCOUNTER — TELEPHONE (OUTPATIENT)
Dept: PRIMARY CARE | Facility: CLINIC | Age: 54
End: 2024-08-27
Payer: MEDICAID

## 2024-08-27 NOTE — TELEPHONE ENCOUNTER
----- Message from Lucille Leung sent at 8/26/2024  7:46 PM EDT -----  I am decreasing your thyroid medication, to get back to a normal range.  The medication is working just making it a little too hyperactive.  We will recheck levels at next appointment.

## 2024-08-29 ENCOUNTER — APPOINTMENT (OUTPATIENT)
Dept: INFUSION THERAPY | Facility: HOSPITAL | Age: 54
End: 2024-08-29
Payer: MEDICAID

## 2024-08-30 ENCOUNTER — APPOINTMENT (OUTPATIENT)
Dept: RHEUMATOLOGY | Facility: CLINIC | Age: 54
End: 2024-08-30
Payer: MEDICAID

## 2024-09-02 NOTE — RESULT ENCOUNTER NOTE
Sharon you are still significantly anemic and getting B12, but your anemia is microcytic meaning it is most likely iron deficiency. I know you have gotten infusions, have you seen hematology yet? I see you are scheduled with Rheumatology, but not he blood doctor. Let me know.

## 2024-09-03 ENCOUNTER — APPOINTMENT (OUTPATIENT)
Dept: PRIMARY CARE | Facility: CLINIC | Age: 54
End: 2024-09-03
Payer: MEDICAID

## 2024-09-03 ENCOUNTER — TELEPHONE (OUTPATIENT)
Dept: PRIMARY CARE | Facility: CLINIC | Age: 54
End: 2024-09-03

## 2024-09-03 DIAGNOSIS — E53.8 CYANOCOBALAMIN DEFICIENCY: Primary | ICD-10-CM

## 2024-09-03 PROCEDURE — 96372 THER/PROPH/DIAG INJ SC/IM: CPT | Performed by: NURSE PRACTITIONER

## 2024-09-03 RX ORDER — CYANOCOBALAMIN 1000 UG/ML
1000 INJECTION, SOLUTION INTRAMUSCULAR; SUBCUTANEOUS ONCE
Status: COMPLETED | OUTPATIENT
Start: 2024-09-03 | End: 2024-09-03

## 2024-09-03 NOTE — TELEPHONE ENCOUNTER
----- Message from Fany Lawrence sent at 9/2/2024  1:35 PM EDT -----  Sharon you are still significantly anemic and getting B12, but your anemia is microcytic meaning it is most likely iron deficiency. I know you have gotten infusions, have you seen hematology yet? I see you are scheduled with Rheumatology, but not he blood doctor. Let me know.

## 2024-09-04 ENCOUNTER — EVALUATION (OUTPATIENT)
Dept: PHYSICAL THERAPY | Facility: HOSPITAL | Age: 54
End: 2024-09-04
Payer: MEDICAID

## 2024-09-04 DIAGNOSIS — S49.91XD RIGHT SHOULDER INJURY, SUBSEQUENT ENCOUNTER: ICD-10-CM

## 2024-09-04 DIAGNOSIS — S49.91XD INJURY OF RIGHT SHOULDER, SUBSEQUENT ENCOUNTER: Primary | ICD-10-CM

## 2024-09-04 DIAGNOSIS — M25.611 IMPAIRED RANGE OF MOTION OF RIGHT SHOULDER: ICD-10-CM

## 2024-09-04 PROBLEM — S49.91XA INJURY OF RIGHT SHOULDER: Status: ACTIVE | Noted: 2024-09-04

## 2024-09-04 PROCEDURE — 97110 THERAPEUTIC EXERCISES: CPT | Mod: GP

## 2024-09-04 PROCEDURE — 97162 PT EVAL MOD COMPLEX 30 MIN: CPT | Mod: GP

## 2024-09-04 ASSESSMENT — ENCOUNTER SYMPTOMS
DEPRESSION: 1
LOSS OF SENSATION IN FEET: 0
OCCASIONAL FEELINGS OF UNSTEADINESS: 0

## 2024-09-04 ASSESSMENT — PAIN SCALES - GENERAL: PAINLEVEL_OUTOF10: 0 - NO PAIN

## 2024-09-04 ASSESSMENT — PAIN DESCRIPTION - DESCRIPTORS: DESCRIPTORS: THROBBING;SHARP

## 2024-09-04 ASSESSMENT — PAIN - FUNCTIONAL ASSESSMENT: PAIN_FUNCTIONAL_ASSESSMENT: 0-10
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
## 2024-09-10 ENCOUNTER — APPOINTMENT (OUTPATIENT)
Dept: PRIMARY CARE | Facility: CLINIC | Age: 54
End: 2024-09-10
Payer: MEDICAID

## 2024-09-10 DIAGNOSIS — E53.8 CYANOCOBALAMIN DEFICIENCY: Primary | ICD-10-CM

## 2024-09-10 PROCEDURE — 96372 THER/PROPH/DIAG INJ SC/IM: CPT

## 2024-09-10 RX ORDER — CYANOCOBALAMIN 1000 UG/ML
1000 INJECTION, SOLUTION INTRAMUSCULAR; SUBCUTANEOUS ONCE
Status: COMPLETED | OUTPATIENT
Start: 2024-09-10 | End: 2024-09-10

## 2024-09-11 ENCOUNTER — TREATMENT (OUTPATIENT)
Dept: PHYSICAL THERAPY | Facility: HOSPITAL | Age: 54
End: 2024-09-11
Payer: MEDICAID

## 2024-09-11 DIAGNOSIS — S49.91XD RIGHT SHOULDER INJURY, SUBSEQUENT ENCOUNTER: Primary | ICD-10-CM

## 2024-09-11 DIAGNOSIS — M25.611 IMPAIRED RANGE OF MOTION OF RIGHT SHOULDER: ICD-10-CM

## 2024-09-11 PROBLEM — S49.91XA INJURY OF RIGHT SHOULDER: Status: ACTIVE | Noted: 2024-09-11

## 2024-09-11 PROCEDURE — 97140 MANUAL THERAPY 1/> REGIONS: CPT | Mod: GP

## 2024-09-11 PROCEDURE — 97110 THERAPEUTIC EXERCISES: CPT | Mod: GP

## 2024-09-11 ASSESSMENT — PAIN - FUNCTIONAL ASSESSMENT: PAIN_FUNCTIONAL_ASSESSMENT: 0-10

## 2024-09-11 ASSESSMENT — PAIN SCALES - GENERAL: PAINLEVEL_OUTOF10: 0 - NO PAIN

## 2024-09-11 NOTE — PROGRESS NOTES
"Physical Therapy    Physical Therapy Treatment    Patient Name: Sharon Reyes  MRN: [id number]  Today's Date: 9/11/2024  Time Calculation  Start Time: 0917  Stop Time: 1000  Time Calculation (min): 43 min  PT Therapeutic Procedures Time Entry  Manual Therapy Time Entry: 12  Therapeutic Exercise Time Entry: 31 Auth Visit Dates: 9/4/24-9/4/25  VISIT# 2    Current Problem  Problem List Items Addressed This Visit             ICD-10-CM    Right shoulder injury, subsequent encounter - Primary S49.91XD    Impaired range of motion of right shoulder M25.611       Subjective   Pt reports she has been compliant with HEP a couple times/day without increase in shoulder pain during activities.      Precautions  Precautions  STEADI Fall Risk Score (The score of 4 or more indicates an increased risk of falling): 4  Medical Precautions:  (RA, OA, Thyroid Disorder, Anemia, Facial Nerve Palsy)       Pain  Pain Assessment: 0-10  0-10 (Numeric) Pain Score: 0 - No pain  Pain Type: Chronic pain  Pain Location: Shoulder  Pain Orientation: Right       Objective   Initiation of POC     TREATMENT:   EXERCISE       Date 9/11/24      VISIT # #2 # # #    REPS REPS REPS REPS          UE Bike              Pulleys      Flexion 3 min       Pulleys      Scaption/Abduction 3 min       Pulleys      IR 3 min              Shoulder Isometrics  Flex/Ext/Abd/IR/ER Seated   1x10 3\" H   Ext/IR/ER             Pendulums 1x5 ea      Towel Slides  Table  Wall   1x10 Flex/Scaption   1x5 Flex/Scaption      Wand  FLEX/ABD/ER/IR 1x10 3\" H ea              I T Y               PROM 12 min   Ice pack during PROM             Manual              IFC or US              Ice  Issued for home             HEP Reviewed in session.         HEP  Access Code: [email or web address]  URL: [email or web address]  Date: 09/04/2024  Prepared by: Анна Em  Exercises  - Seated Scapular Retraction  - 1 x daily - 7 x weekly - 3 sets - 10 reps - 3-5 hold  - Seated Shoulder Flexion " Towel Slide at Table Top  - 1 x daily - 7 x weekly - 3 sets - 10 reps  - Seated Shoulder Scaption Slide at Table Top with Forearm in Neutral  - 1 x daily - 7 x weekly - 3 sets - 10 reps  - Circular Shoulder Pendulum with Table Support  - 1 x daily - 7 x weekly - 3 sets - 10 reps  - Flexion-Extension Shoulder Pendulum with Table Support  - 1 x daily - 7 x weekly - 3 sets - 10 reps  - Horizontal Shoulder Pendulum with Table Support  - 1 x daily - 7 x weekly - 3 sets - 10 reps    Assessment:  Pt tolerates initiation of treatment well with minimal-moderate difficulty limited by pain. Attempted towel slides on wall due to 0/10 pain and reduced difficulty with towel slides on table with increased pain with AAROM at wall. 0/10 pain resting at end of session with 5/10 pain with AROM. Pt demonstrates good understanding of HEP at this time. Ice provided at end of session for home.     OP EDUCATION:  Outpatient Education  Individual(s) Educated: Patient  Education Provided: Anatomy, Body Mechanics, Home Exercise Program, POC  Risk and Benefits Discussed with Patient/Caregiver/Other: yes  Patient/Caregiver Demonstrated Understanding: yes  Plan of Care Discussed and Agreed Upon: yes  Patient Response to Education: Patient/Caregiver Verbalized Understanding of Information, Patient/Caregiver Asked Appropriate Questions, Patient/Caregiver Performed Return Demonstration of Exercises/Activities  Education Comment: Reviewed HEP with pt demonstrating good understanding. Educated pt through each intervention in session for understanding of purpose of each exercise to improve compliance with POC. Educated pt on future progression and goals of POC with goal at this time to improve ROM. Educated pt on monitoring pain/soreness before next session to determine progression of intervention intensity at this time.    Plan:  Monitor pt tolerance to initiation of treatment and progress as pt tolerates to improve functional mobility  OP PT  Plan  Treatment/Interventions: Education/ Instruction, Electrical stimulation, Manual therapy, Neuromuscular re-education, Taping techniques, Therapeutic activities, Therapeutic exercises, Ultrasound  PT Plan: Skilled PT  PT Frequency: 2 times per week  Duration: 8-10 weeks (from IE 9/4/24)  Onset Date: 09/04/24  Certification Period Start Date: 08/26/24  Certification Period End Date: 12/31/24  Rehab Potential: Fair  Plan of Care Agreement: Patient    Goals:  Active       PT Problem       Patient will demonstrate ability to raise 3lb dumbbell in RUE shoulder flexion and/or scaption to 90 degrees without increase in 2 points on pain scale from baseline.  (Progressing)       Start:  09/04/24    Expected End:  12/04/24            Patient will achieve right shoulder flexion and abduction ROM greater than or equal to 115 degrees for functional mobility for ADLs and household chores. (Progressing)       Start:  09/04/24    Expected End:  12/04/24            Patient will demonstrate independence in home program for support of progression (Progressing)       Start:  09/04/24    Expected End:  12/04/24            Patient will report pain of less than or equal to 4/10 throughout treatment session with stretching and strengthening interventions demonstrating a reduction of overall pain. (Progressing)       Start:  09/04/24    Expected End:  12/04/24            Patient will show a significant change in QuickDASH patient reported outcome tool to demonstrate subjective improvement.        Start:  09/04/24    Expected End:  12/04/24

## 2024-09-13 ENCOUNTER — APPOINTMENT (OUTPATIENT)
Dept: PHYSICAL THERAPY | Facility: HOSPITAL | Age: 54
End: 2024-09-13
Payer: MEDICAID

## 2024-09-16 ENCOUNTER — APPOINTMENT (OUTPATIENT)
Dept: PRIMARY CARE | Facility: CLINIC | Age: 54
End: 2024-09-16
Payer: MEDICAID

## 2024-09-16 DIAGNOSIS — E53.8 CYANOCOBALAMIN DEFICIENCY: Primary | ICD-10-CM

## 2024-09-16 PROCEDURE — 96372 THER/PROPH/DIAG INJ SC/IM: CPT

## 2024-09-16 RX ORDER — CYANOCOBALAMIN 1000 UG/ML
1000 INJECTION, SOLUTION INTRAMUSCULAR; SUBCUTANEOUS ONCE
Status: COMPLETED | OUTPATIENT
Start: 2024-09-16 | End: 2024-09-16

## 2024-09-17 ENCOUNTER — TREATMENT (OUTPATIENT)
Dept: PHYSICAL THERAPY | Facility: HOSPITAL | Age: 54
End: 2024-09-17
Payer: MEDICAID

## 2024-09-17 DIAGNOSIS — S49.91XD RIGHT SHOULDER INJURY, SUBSEQUENT ENCOUNTER: Primary | ICD-10-CM

## 2024-09-17 DIAGNOSIS — M25.611 IMPAIRED RANGE OF MOTION OF RIGHT SHOULDER: ICD-10-CM

## 2024-09-17 PROCEDURE — 97110 THERAPEUTIC EXERCISES: CPT | Mod: GP

## 2024-09-17 PROCEDURE — 97140 MANUAL THERAPY 1/> REGIONS: CPT | Mod: GP

## 2024-09-17 ASSESSMENT — PAIN - FUNCTIONAL ASSESSMENT: PAIN_FUNCTIONAL_ASSESSMENT: 0-10

## 2024-09-17 ASSESSMENT — PAIN SCALES - GENERAL: PAINLEVEL_OUTOF10: 0 - NO PAIN

## 2024-09-17 NOTE — PROGRESS NOTES
"Physical Therapy    Physical Therapy Treatment    Patient Name: Sharon Reyes  MRN: [id number]  Today's Date: 9/17/2024  Time Calculation  Start Time: 1116  Stop Time: 1158  Time Calculation (min): 42 min  PT Therapeutic Procedures Time Entry  Manual Therapy Time Entry: 8  Therapeutic Exercise Time Entry: 36  Auth Visit Dates: 9/4/24-9/4/25  VISIT# 3    Current Problem  Problem List Items Addressed This Visit             ICD-10-CM    Right shoulder injury, subsequent encounter - Primary S49.91XD    Impaired range of motion of right shoulder M25.611       Subjective   Pt reporting 0/10 pain at beginning of session with pt noting it will increase with activity. Pt continues to report daily compliance with HEP. Pt notes increased RUE soreness 9/13. Pt scheduled to see Dr. Reza 9/18 with Xrays to assess shoulder pain.      Precautions  Precautions  STEADI Fall Risk Score (The score of 4 or more indicates an increased risk of falling): 4  Medical Precautions:  (RA, OA, Thyroid Disorder, Anemia, Facial Nerve Palsy)       Pain  Pain Assessment: 0-10  0-10 (Numeric) Pain Score: 0 - No pain  Pain Type: Chronic pain  Pain Location: Shoulder  Pain Orientation: Right       Objective   Progressed table slides with added wedge and raised tables      TREATMENT:   EXERCISE       Date 9/11/24 9/17/24     VISIT # #2 #3 # #    REPS REPS REPS REPS          UE Bike              Pulleys      Flexion 3 min  3 min      Pulleys      Scaption/Abduction 3 min  3 min      Pulleys      IR 3 min  3 min             Shoulder Isometrics  Flex/Ext/Abd/IR/ER Seated   1x10 3\" H   Ext/IR/ER Seated   1x10 3\" H   Ext/IR/ER/scap protraction            Pendulums 1x5 ea HEP     Towel Slides  Table  Wall   1x10 Flex/Scaption   1x5 Flex/Scaption   1x10 Flex/Scaption with wedge     Wand  FLEX/ABD/ER/IR 1x10 3\" H ea  1x10 3\" H ea     Scapular Punches  1x10 RUE            Tbands  Mid Rows  Pull Downs  IR              I T Y               PROM 12 min   Ice pack " "during PROM 8 min   Ice pack during PROM            Manual              IFC or US              Ice  Issued for home Issued for home            HEP Reviewed in session.         HEP  Access Code: [email or web address]  URL: [email or web address]  Date: 09/04/2024  Prepared by: Анна Em  Exercises  - Seated Scapular Retraction  - 1 x daily - 7 x weekly - 3 sets - 10 reps - 3-5 hold  - Seated Shoulder Flexion Towel Slide at Table Top  - 1 x daily - 7 x weekly - 3 sets - 10 reps  - Seated Shoulder Scaption Slide at Table Top with Forearm in Neutral  - 1 x daily - 7 x weekly - 3 sets - 10 reps  - Circular Shoulder Pendulum with Table Support  - 1 x daily - 7 x weekly - 3 sets - 10 reps  - Flexion-Extension Shoulder Pendulum with Table Support  - 1 x daily - 7 x weekly - 3 sets - 10 reps  - Horizontal Shoulder Pendulum with Table Support  - 1 x daily - 7 x weekly - 3 sets - 10 reps    Assessment:  Pt tolerates treatment well without pain during PROM this date and visual improvements in RUE ROM with AAROM interventions. 1/10 \"throbbing\" pain end of session.     OP EDUCATION:  Outpatient Education  Individual(s) Educated: Patient  Education Provided: Anatomy, Body Mechanics, Home Exercise Program, POC  Risk and Benefits Discussed with Patient/Caregiver/Other: yes  Plan of Care Discussed and Agreed Upon: yes  Patient Response to Education: Patient/Caregiver Verbalized Understanding of Information, Patient/Caregiver Asked Appropriate Questions  Education Comment: Educated pt on improvements this date. Educated pt to monitor tolerance to activity for possible progression at next appointment. Educated pt on calling neurologist for follow up appointment for continued symptomology of facial nerve palsy dx. Pt reports understanding at this time.    Plan:  Continue to progress UE strength and ROM as pt tolerates to improve functional mobility  OP PT Plan  Treatment/Interventions: Education/ Instruction, Electrical " stimulation, Manual therapy, Neuromuscular re-education, Taping techniques, Therapeutic activities, Therapeutic exercises, Ultrasound  PT Plan: Skilled PT  PT Frequency: 2 times per week  Duration: 8-10 weeks (from IE 9/4/24)  Onset Date: 09/04/24  Certification Period Start Date: 08/26/24  Certification Period End Date: 12/31/24  Rehab Potential: Fair  Plan of Care Agreement: Patient    Goals:  Active       PT Problem       Patient will demonstrate ability to raise 3lb dumbbell in RUE shoulder flexion and/or scaption to 90 degrees without increase in 2 points on pain scale from baseline.  (Progressing)       Start:  09/04/24    Expected End:  12/04/24            Patient will achieve right shoulder flexion and abduction ROM greater than or equal to 115 degrees for functional mobility for ADLs and household chores. (Progressing)       Start:  09/04/24    Expected End:  12/04/24            Patient will demonstrate independence in home program for support of progression (Progressing)       Start:  09/04/24    Expected End:  12/04/24            Patient will report pain of less than or equal to 4/10 throughout treatment session with stretching and strengthening interventions demonstrating a reduction of overall pain. (Progressing)       Start:  09/04/24    Expected End:  12/04/24            Patient will show a significant change in QuickDASH patient reported outcome tool to demonstrate subjective improvement.        Start:  09/04/24    Expected End:  12/04/24

## 2024-09-18 ENCOUNTER — APPOINTMENT (OUTPATIENT)
Dept: ORTHOPEDIC SURGERY | Facility: CLINIC | Age: 54
End: 2024-09-18
Payer: MEDICAID

## 2024-09-18 ENCOUNTER — HOSPITAL ENCOUNTER (OUTPATIENT)
Dept: RADIOLOGY | Facility: CLINIC | Age: 54
Discharge: HOME | End: 2024-09-18
Payer: MEDICAID

## 2024-09-18 VITALS — HEIGHT: 66 IN | BODY MASS INDEX: 27.64 KG/M2 | WEIGHT: 172 LBS

## 2024-09-18 DIAGNOSIS — M75.101 TEAR OF RIGHT ROTATOR CUFF, UNSPECIFIED TEAR EXTENT, UNSPECIFIED WHETHER TRAUMATIC: ICD-10-CM

## 2024-09-18 DIAGNOSIS — M25.511 RIGHT SHOULDER PAIN, UNSPECIFIED CHRONICITY: ICD-10-CM

## 2024-09-18 DIAGNOSIS — S49.91XD INJURY OF RIGHT SHOULDER, SUBSEQUENT ENCOUNTER: ICD-10-CM

## 2024-09-18 PROCEDURE — 99204 OFFICE O/P NEW MOD 45 MIN: CPT | Performed by: STUDENT IN AN ORGANIZED HEALTH CARE EDUCATION/TRAINING PROGRAM

## 2024-09-18 PROCEDURE — 3008F BODY MASS INDEX DOCD: CPT | Performed by: STUDENT IN AN ORGANIZED HEALTH CARE EDUCATION/TRAINING PROGRAM

## 2024-09-18 PROCEDURE — 1036F TOBACCO NON-USER: CPT | Performed by: STUDENT IN AN ORGANIZED HEALTH CARE EDUCATION/TRAINING PROGRAM

## 2024-09-18 PROCEDURE — 73030 X-RAY EXAM OF SHOULDER: CPT | Mod: RIGHT SIDE | Performed by: RADIOLOGY

## 2024-09-18 PROCEDURE — 73030 X-RAY EXAM OF SHOULDER: CPT | Mod: RT

## 2024-09-18 ASSESSMENT — PAIN - FUNCTIONAL ASSESSMENT: PAIN_FUNCTIONAL_ASSESSMENT: NO/DENIES PAIN
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
## 2024-09-19 ENCOUNTER — TREATMENT (OUTPATIENT)
Dept: PHYSICAL THERAPY | Facility: HOSPITAL | Age: 54
End: 2024-09-19
Payer: MEDICAID

## 2024-09-19 DIAGNOSIS — M25.611 IMPAIRED RANGE OF MOTION OF RIGHT SHOULDER: ICD-10-CM

## 2024-09-19 PROCEDURE — 97140 MANUAL THERAPY 1/> REGIONS: CPT | Mod: GP,CQ

## 2024-09-19 PROCEDURE — 97110 THERAPEUTIC EXERCISES: CPT | Mod: GP,CQ

## 2024-09-19 ASSESSMENT — PAIN SCALES - GENERAL: PAINLEVEL_OUTOF10: 0 - NO PAIN

## 2024-09-19 ASSESSMENT — PAIN - FUNCTIONAL ASSESSMENT: PAIN_FUNCTIONAL_ASSESSMENT: 0-10

## 2024-09-19 NOTE — PROGRESS NOTES
"Physical Therapy    Physical Therapy Treatment    Patient Name: Sharon Reyes  MRN: [id number]  : 1970   Today's Date: 2024  Time Calculation  Start Time: 100  Stop Time: 147  Time Calculation (min): 47 min     PT Therapeutic Procedures Time Entry  Manual Therapy Time Entry: 10  Therapeutic Exercise Time Entry: 37                 Visit Number:       Current Problem  Problem List Items Addressed This Visit             ICD-10-CM    Impaired range of motion of right shoulder M25.611        Subjective   General  Pt reports no R shoulder pain today but it may be because she has not done much today, states her pain increases with movement.   Precautions  Precautions  Medical Precautions:  (RA, OA, Thyroid Disorder, Anemia, Facial Nerve Palsy)    Pain  Pain Assessment: 0-10  0-10 (Numeric) Pain Score: 0 - No pain  Pain Type: Chronic pain  Pain Location: Shoulder  Pain Orientation: Right      Objective     Treatments:     EXERCISE       Date 24    VISIT # #2 #3 #4 #    REPS REPS REPS REPS          UE Bike              Pulleys      Flexion 3 min  3 min  3 min    Pulleys      Scaption/Abduction 3 min  3 min  3 min    Pulleys      IR 3 min  3 min  3 min           Shoulder Isometrics  Flex/Ext/Abd/IR/ER Seated   1x10 3\" H   Ext/IR/ER Seated   1x10 3\" H   Ext/IR/ER/scap protraction Standing 5sec x10ea            Pendulums 1x5 ea HEP     Towel Slides  Table  Wall   1x10 Flex/Scaption   1x5 Flex/Scaption   1x10 Flex/Scaption with wedge 1x10 Flex/Scaption with wedge    Wand  FLEX/ABD/ER/IR 1x10 3\" H ea  1x10 3\" H ea Seated x10ea     Scapular Punches  1x10 RUE Supine 1x10           Tbands  Mid Rows  Pull Downs  IR              I T Y               PROM 12 min   Ice pack during PROM 8 min   Ice pack during PROM 10min            Manual              IFC or US              Ice  Issued for home Issued for home Declined            HEP Reviewed in session.           Assessment:   Pt tolerated all " exercises well with minimal difficulty reporting no pain at end of session.     Plan:  Continue to improve R shoulder ROM and strength as tolerated for improved functional abilities with decreased pain.   OP PT Plan  Treatment/Interventions: Education/ Instruction, Electrical stimulation, Manual therapy, Neuromuscular re-education, Taping techniques, Therapeutic activities, Therapeutic exercises, Ultrasound  PT Plan: Skilled PT  PT Frequency: 2 times per week  Duration: 8-10 weeks (from IE 9/4/24)  Onset Date: 09/04/24  Certification Period Start Date: 08/26/24  Certification Period End Date: 12/31/24  Rehab Potential: Fair  Plan of Care Agreement: PatientContinue to improve     Goals:  Active       PT Problem       Patient will demonstrate ability to raise 3lb dumbbell in RUE shoulder flexion and/or scaption to 90 degrees without increase in 2 points on pain scale from baseline.  (Progressing)       Start:  09/04/24    Expected End:  12/04/24            Patient will achieve right shoulder flexion and abduction ROM greater than or equal to 115 degrees for functional mobility for ADLs and household chores. (Progressing)       Start:  09/04/24    Expected End:  12/04/24            Patient will demonstrate independence in home program for support of progression (Progressing)       Start:  09/04/24    Expected End:  12/04/24            Patient will report pain of less than or equal to 4/10 throughout treatment session with stretching and strengthening interventions demonstrating a reduction of overall pain. (Progressing)       Start:  09/04/24    Expected End:  12/04/24            Patient will show a significant change in QuickDASH patient reported outcome tool to demonstrate subjective improvement.        Start:  09/04/24    Expected End:  12/04/24

## 2024-09-24 ENCOUNTER — APPOINTMENT (OUTPATIENT)
Dept: RHEUMATOLOGY | Facility: CLINIC | Age: 54
End: 2024-09-24
Payer: MEDICAID

## 2024-09-24 VITALS
WEIGHT: 172 LBS | SYSTOLIC BLOOD PRESSURE: 126 MMHG | HEART RATE: 59 BPM | DIASTOLIC BLOOD PRESSURE: 69 MMHG | OXYGEN SATURATION: 98 % | BODY MASS INDEX: 27.64 KG/M2 | HEIGHT: 66 IN

## 2024-09-24 DIAGNOSIS — R76.8 POSITIVE ANA (ANTINUCLEAR ANTIBODY): Primary | ICD-10-CM

## 2024-09-24 DIAGNOSIS — R76.8 ANTI-RNP ANTIBODIES PRESENT: ICD-10-CM

## 2024-09-24 DIAGNOSIS — M15.9 PRIMARY OSTEOARTHRITIS INVOLVING MULTIPLE JOINTS: ICD-10-CM

## 2024-09-24 PROCEDURE — 99204 OFFICE O/P NEW MOD 45 MIN: CPT | Performed by: INTERNAL MEDICINE

## 2024-09-24 PROCEDURE — 1036F TOBACCO NON-USER: CPT | Performed by: INTERNAL MEDICINE

## 2024-09-24 PROCEDURE — 3008F BODY MASS INDEX DOCD: CPT | Performed by: INTERNAL MEDICINE

## 2024-09-24 NOTE — PROGRESS NOTES
"Subjective . Sharon Reyes is a 54 y.o. female who presents for New Patient Visit (NPV- Positive AMAYA).    HPI.  54-year-old female with history of arthritis, FMS, left Bell's palsy, iron deficiency anemia, hypothyroidism and right rotator cuff tear referred by primary healthcare provider in consultation for positive AMAYA.    She saw a rheumatologist several years ago and was diagnosed with rheumatoid arthritis, fibromyalgia with positive lupus markers.  She was treated with methotrexate, Cymbalta and gabapentin.  However she did not notice improvement of her symptoms.    He reports chronic intermittent pain in her hands, left shoulder, hips, knees and feet.  Pain is worse during the cold and damp weather.  He rates joint pain up to 7/10 at times.  Sometimes she notes swelling of the hands and knees.  She takes kratom and feels better.    Father has osteoarthritis.    She has no history of pleuritic chest pain, shortness of breath, fever, chills, night sweats, weight loss,  dry mouth, skin rash,photosensitivity, mucocutaneous ulcers, hair loss, iritis, Raynaud's phenomena, inflammatory bowel disease and psoriasis.    Social history: She is .  She does not smoke or drink.  Currently she is not working.  Surgical history: Gastric bypass surgery.    Review of Systems   All other systems reviewed and are negative.    Objective     Blood pressure 126/69, pulse 59, height 1.676 m (5' 5.98\"), weight 78 kg (172 lb), SpO2 98%.    Physical Exam.  Gen. AAO x3, NAD.  HEENT: No pallor or icterus, PERRLA, EOMI. left eye was not examined.  Left facial droop.  MM moist,Parotid glands  not enlarged. No cervical lymphadenopathy .  Skin: No rashes.  Heart: S1, S2/ RRR. No murmurs or gallops.  Lungs: CTA B.  Abdomen: Soft, NT/ND, BS regular.  MSK: Bilateral CMC squaring with positive grinding.  Bilateral wrist, elbow without swelling and tenderness.  Left shoulder with full range of motion.  Right shoulder with decreased forward " flexion, abduction, external and internal rotation with positive empty can.  Neck, spine and SI joint without tenderness.  Bilateral Candido's negative.  Bilateral knee with patellofemoral crepitation.  Bilateral ankle without swelling and tenderness.  Bilateral first MTP hypertrophy.  Left second and fourth toe deformity bilateral hammertoes.    Neuro: CN II-XII intact. Sensation to touch intact.Strength 5/5 throughout. DTR 2+ and symmetrical.  Psych:Appropriate mood and behavior  EXT: No edema    Assessment/Plan . 54-year-old female with history of arthritis, FMS, left Bell's palsy, iron deficiency anemia, hypothyroidism and right rotator cuff tear presented with chronic joint pain.  Blood work obtained 2 months ago showed positive AMAYA at 1: 80 with positive anti-RNP.  Rheumatoid factor negative.  Sed rate and CRP within normal limits.    #1: Weak positive AMAYA and positive anti-RNP.  Nonspecific.  Patient was reassured.  #2: Joint pain appears secondary to osteoarthritis.  -Discussed to take Tylenol or over-the-counter NSAIDs as needed.  -She is following with podiatry.    #3: Right rotator cuff tear.  She is following with orthopedist.    Follow-up with rheumatology as needed.     This note was partially generated using the Dragon Voice recognition system. There may be some incorrect wording, spelling and/or spelling errors or punctuation errors that were not corrected prior to committing the note to the medical record.    Problem List Items Addressed This Visit    None      Active Ambulatory Problems     Diagnosis Date Noted    Facial nerve palsy 06/05/2024    Anemia 06/05/2024    Neuropathic pain 06/05/2024    Lightheadedness 06/05/2024    Difficulty eating 07/22/2024    Right shoulder injury, subsequent encounter 09/04/2024    Impaired range of motion of right shoulder 09/04/2024    Injury of right shoulder 09/11/2024     Resolved Ambulatory Problems     Diagnosis Date Noted    No Resolved Ambulatory Problems      Past Medical History:   Diagnosis Date    Arthritis 2009    Bell's palsy     Bunion Unknown    Cervical disc disorder Unknown    CTS (carpal tunnel syndrome) Unknown    Hammer toe Unknown    Lumbosacral disc disease Unknown    Rotator cuff syndrome 03/2022    Scoliosis Unknown       Family History   Problem Relation Name Age of Onset    Cancer Mother Demi Meyers     Diabetes Mother's Sister Sharon        History reviewed. No pertinent surgical history.    Social History     Tobacco Use   Smoking Status Never   Smokeless Tobacco Never       Allergies  Toradol [ketorolac]    Current Meds  Current Outpatient Medications   Medication Instructions    acetaminophen (TYLENOL) 325 mg, oral, Every 6 hours PRN, 4 tabs each time    ergocalciferol (VITAMIN D-2) 50,000 Units, oral, Once Weekly    hypromellose (Systane GeL) 0.3 % opthalmic gel 1 drop, Left Eye, 4 times daily PRN    levothyroxine (SYNTHROID, LEVOXYL) 88 mcg, oral, Daily, Take on an empty stomach at the same time each day, either 30 to 60 minutes prior to breakfast    UNABLE TO FIND Med Name: kratom, 4 tsp every day for chronic pain (fibro, arthritis)    UNABLE TO FIND Med Name: exlax        Lab Results   Component Value Date    ANATITER 1:80 07/25/2024    RF <10 07/25/2024    SEDRATE 25 07/25/2024    CRP <0.10 07/25/2024    DNADS <1.0 07/25/2024    CKTOTAL 161 09/05/2023        Yuniel Prieto MD

## 2024-09-24 NOTE — PATIENT INSTRUCTIONS
Take Tylenol or over-the-counter NSAIDs as needed.  Call me if any question.  Follow-up as needed.

## 2024-09-25 ENCOUNTER — TREATMENT (OUTPATIENT)
Dept: PHYSICAL THERAPY | Facility: HOSPITAL | Age: 54
End: 2024-09-25
Payer: MEDICAID

## 2024-09-25 DIAGNOSIS — M25.611 IMPAIRED RANGE OF MOTION OF RIGHT SHOULDER: ICD-10-CM

## 2024-09-25 PROCEDURE — 97110 THERAPEUTIC EXERCISES: CPT | Mod: GP,CQ

## 2024-09-25 PROCEDURE — 97140 MANUAL THERAPY 1/> REGIONS: CPT | Mod: GP,CQ

## 2024-09-25 ASSESSMENT — PAIN - FUNCTIONAL ASSESSMENT: PAIN_FUNCTIONAL_ASSESSMENT: 0-10

## 2024-09-25 ASSESSMENT — ENCOUNTER SYMPTOMS
OCCASIONAL FEELINGS OF UNSTEADINESS: 0
DEPRESSION: 0
LOSS OF SENSATION IN FEET: 0

## 2024-09-25 ASSESSMENT — PAIN SCALES - GENERAL: PAINLEVEL_OUTOF10: 0 - NO PAIN

## 2024-09-25 NOTE — PROGRESS NOTES
"Physical Therapy    Physical Therapy Treatment    Patient Name: Sharon Reyes  MRN: [id number]  : 1970   Today's Date: 2024  Time Calculation  Start Time: 1300  Stop Time: 1341  Time Calculation (min): 41 min     PT Therapeutic Procedures Time Entry  Manual Therapy Time Entry: 8  Therapeutic Exercise Time Entry: 33                 Visit Number:     Current Problem  Problem List Items Addressed This Visit             ICD-10-CM    Impaired range of motion of right shoulder M25.611        Subjective   General  Pt states she has an MRI of her R shoulder scheduled for next Saturday. Pt states she saw her Rheumatologist yesterday and he said it looks like her R proximal bicep has retracted. Pt states she fell yesterday when her shoe caught on the door mat at the Dr office and she tripped causing a small skin tear on her R shin states she was not hurt and did not seek treatment. Pt states she's noticed increased ease with putting her bra on.   Precautions  Precautions  STEADI Fall Risk Score (The score of 4 or more indicates an increased risk of falling): 3  Medical Precautions:  (RA, OA, Thyroid Disorder, Anemia, Facial Nerve Palsy)    Pain  Pain Assessment: 0-10  0-10 (Numeric) Pain Score: 0 - No pain  Pain Type: Chronic pain  Pain Location: Shoulder  Pain Orientation: Right      Objective   Seated AAROM shoulder flexion with cane 150*  Seated AAROM shoulder scaption with cane 110*  PROM WNL in all directions   Treatments:  EXERCISE       Date 24   VISIT # #2 #3 #4 #5    REPS REPS REPS REPS          UE Bike              Pulleys      Flexion 3 min  3 min  3 min 3 min   Pulleys      Scaption/Abduction 3 min  3 min  3 min 3 min   Pulleys      IR 3 min  3 min  3 min 3 min          Shoulder Isometrics  Flex/Ext/Abd/IR/ER Seated   1x10 3\" H   Ext/IR/ER Seated   1x10 3\" H   Ext/IR/ER/scap protraction Standing 5sec x10ea  Standing 5sec x10ea           Pendulums 1x5 ea HEP     Towel " "Slides  Table  Wall   1x10 Flex/Scaption   1x5 Flex/Scaption   1x10 Flex/Scaption with wedge 1x10 Flex/Scaption with wedge    Ball on wall     flex/ext x10ea    Wand  FLEX/ABD/ER/IR 1x10 3\" H ea  1x10 3\" H ea Seated x10ea  Seated 2x10ea    Scapular Punches  1x10 RUE Supine 1x10 Supine 2x10          Tbands  Mid Rows  Pull Downs  IR      Red x10  Red x10   Wall push ups    x10   I T Y               PROM 12 min   Ice pack during PROM 8 min   Ice pack during PROM 10min  8min          Manual              IFC or US              Ice  Issued for home Issued for home Declined            HEP Reviewed in session.            Assessment:   Pt tolerated all exercises well with minimal difficulty reporting no pain through out session. Pt tolerated new exercises well this date.     Plan:  Continue to improve R shoulder ROM and strength as tolerated for improved functional abilities with decreased pain.   OP PT Plan  Treatment/Interventions: Education/ Instruction, Electrical stimulation, Manual therapy, Neuromuscular re-education, Taping techniques, Therapeutic activities, Therapeutic exercises, Ultrasound  PT Plan: Skilled PT  PT Frequency: 2 times per week  Duration: 8-10 weeks (from IE 9/4/24)  Onset Date: 09/04/24  Certification Period Start Date: 08/26/24  Certification Period End Date: 12/31/24  Rehab Potential: Fair  Plan of Care Agreement: Patient    Goals:  Active       PT Problem       Patient will demonstrate ability to raise 3lb dumbbell in RUE shoulder flexion and/or scaption to 90 degrees without increase in 2 points on pain scale from baseline.  (Progressing)       Start:  09/04/24    Expected End:  12/04/24            Patient will achieve right shoulder flexion and abduction ROM greater than or equal to 115 degrees for functional mobility for ADLs and household chores. (Progressing)       Start:  09/04/24    Expected End:  12/04/24            Patient will demonstrate independence in home program for support of " progression (Progressing)       Start:  09/04/24    Expected End:  12/04/24         Goal Note       Patient will demonstrate independence in home program for support of progression              Patient will report pain of less than or equal to 4/10 throughout treatment session with stretching and strengthening interventions demonstrating a reduction of overall pain. (Progressing)       Start:  09/04/24    Expected End:  12/04/24         Goal Note       Patient will report pain of 4/10 demonstrating a reduction of overall pain              Patient will show a significant change in QuickDASH patient reported outcome tool to demonstrate subjective improvement.        Start:  09/04/24    Expected End:  12/04/24

## 2024-09-26 ENCOUNTER — INFUSION (OUTPATIENT)
Dept: INFUSION THERAPY | Facility: HOSPITAL | Age: 54
End: 2024-09-26
Payer: MEDICAID

## 2024-09-26 VITALS
HEART RATE: 68 BPM | TEMPERATURE: 97.6 F | RESPIRATION RATE: 16 BRPM | DIASTOLIC BLOOD PRESSURE: 76 MMHG | SYSTOLIC BLOOD PRESSURE: 119 MMHG | OXYGEN SATURATION: 97 %

## 2024-09-26 DIAGNOSIS — D50.0 IRON DEFICIENCY ANEMIA DUE TO CHRONIC BLOOD LOSS: ICD-10-CM

## 2024-09-26 PROCEDURE — 2500000004 HC RX 250 GENERAL PHARMACY W/ HCPCS (ALT 636 FOR OP/ED): Performed by: FAMILY MEDICINE

## 2024-09-26 PROCEDURE — 96365 THER/PROPH/DIAG IV INF INIT: CPT | Mod: INF

## 2024-09-26 RX ORDER — ALBUTEROL SULFATE 0.83 MG/ML
3 SOLUTION RESPIRATORY (INHALATION) AS NEEDED
OUTPATIENT
Start: 2024-10-03

## 2024-09-26 RX ORDER — EPINEPHRINE 0.3 MG/.3ML
0.3 INJECTION SUBCUTANEOUS EVERY 5 MIN PRN
OUTPATIENT
Start: 2024-10-03

## 2024-09-26 RX ORDER — FAMOTIDINE 10 MG/ML
20 INJECTION INTRAVENOUS ONCE AS NEEDED
OUTPATIENT
Start: 2024-10-03

## 2024-09-26 RX ORDER — HEPARIN SODIUM,PORCINE/PF 10 UNIT/ML
50 SYRINGE (ML) INTRAVENOUS AS NEEDED
OUTPATIENT
Start: 2024-09-26

## 2024-09-26 RX ORDER — DIPHENHYDRAMINE HYDROCHLORIDE 50 MG/ML
50 INJECTION INTRAMUSCULAR; INTRAVENOUS AS NEEDED
OUTPATIENT
Start: 2024-10-03

## 2024-09-26 RX ORDER — HEPARIN 100 UNIT/ML
500 SYRINGE INTRAVENOUS AS NEEDED
OUTPATIENT
Start: 2024-09-26

## 2024-09-26 ASSESSMENT — ENCOUNTER SYMPTOMS
OCCASIONAL FEELINGS OF UNSTEADINESS: 0
DEPRESSION: 0
LOSS OF SENSATION IN FEET: 1

## 2024-09-26 ASSESSMENT — PATIENT HEALTH QUESTIONNAIRE - PHQ9
2. FEELING DOWN, DEPRESSED OR HOPELESS: NOT AT ALL
1. LITTLE INTEREST OR PLEASURE IN DOING THINGS: NOT AT ALL
SUM OF ALL RESPONSES TO PHQ9 QUESTIONS 1 AND 2: 0

## 2024-09-26 ASSESSMENT — COLUMBIA-SUICIDE SEVERITY RATING SCALE - C-SSRS
2. HAVE YOU ACTUALLY HAD ANY THOUGHTS OF KILLING YOURSELF?: NO
6. HAVE YOU EVER DONE ANYTHING, STARTED TO DO ANYTHING, OR PREPARED TO DO ANYTHING TO END YOUR LIFE?: NO
1. IN THE PAST MONTH, HAVE YOU WISHED YOU WERE DEAD OR WISHED YOU COULD GO TO SLEEP AND NOT WAKE UP?: NO

## 2024-09-26 ASSESSMENT — LIFESTYLE VARIABLES
HOW MANY STANDARD DRINKS CONTAINING ALCOHOL DO YOU HAVE ON A TYPICAL DAY: PATIENT DOES NOT DRINK
AUDIT-C TOTAL SCORE: 0
HOW OFTEN DO YOU HAVE A DRINK CONTAINING ALCOHOL: NEVER
HOW OFTEN DO YOU HAVE SIX OR MORE DRINKS ON ONE OCCASION: NEVER
SKIP TO QUESTIONS 9-10: 1

## 2024-09-27 ENCOUNTER — TREATMENT (OUTPATIENT)
Dept: PHYSICAL THERAPY | Facility: HOSPITAL | Age: 54
End: 2024-09-27
Payer: MEDICAID

## 2024-09-27 DIAGNOSIS — M25.611 IMPAIRED RANGE OF MOTION OF RIGHT SHOULDER: ICD-10-CM

## 2024-09-27 PROCEDURE — 97110 THERAPEUTIC EXERCISES: CPT | Mod: GP,CQ

## 2024-09-27 ASSESSMENT — PAIN SCALES - GENERAL: PAINLEVEL_OUTOF10: 0 - NO PAIN

## 2024-09-27 ASSESSMENT — PAIN - FUNCTIONAL ASSESSMENT: PAIN_FUNCTIONAL_ASSESSMENT: 0-10

## 2024-09-27 NOTE — PROGRESS NOTES
"Physical Therapy    Physical Therapy Treatment    Patient Name: Sharon Reyes  MRN: [id number]  : 1970   Today's Date: 2024  Time Calculation  Start Time: 1300  Stop Time: 1340  Time Calculation (min): 40 min     PT Therapeutic Procedures Time Entry  Therapeutic Exercise Time Entry: 40                 Visit Number:     Current Problem  Problem List Items Addressed This Visit             ICD-10-CM    Impaired range of motion of right shoulder M25.611        Subjective   General  Pt states she felt good after her last session.   Precautions  Precautions  Medical Precautions:  (RA, OA, Thyroid Disorder, Anemia, Facial Nerve Palsy)    Pain  Pain Assessment: 0-10  0-10 (Numeric) Pain Score: 0 - No pain  Pain Location: Shoulder  Pain Orientation: Right      Objective     Treatments:     EXERCISE       Date 24   VISIT # #3 #4 #5 #6    REPS REPS REPS           UE Bike              Pulleys      Flexion 3 min  3 min 3 min 3 min   Pulleys      Scaption/Abduction 3 min  3 min 3 min 3 min   Pulleys      IR 3 min  3 min 3 min 3 min          Shoulder Isometrics  Flex/Ext/Abd/IR/ER Seated   1x10 3\" H   Ext/IR/ER/scap protraction Standing 5sec x10ea  Standing 5sec x10ea  Standing 5sec x10ea           Pendulums HEP      Towel Slides  Table  Wall   1x10 Flex/Scaption with wedge 1x10 Flex/Scaption with wedge     Ball on wall    flex/ext x10ea  X10ea    Wand  FLEX/ABD/ER/IR 1x10 3\" H ea Seated x10ea  Seated 2x10ea  Seated 2x10ea    Scapular Punches 1x10 RUE Supine 1x10 Supine 2x10 Supine 2x10          Tbands  Mid Rows  Pull Downs  IR     Red x10  Red x10   Red 2x10  Red 2x10   Wall push ups   x10 2x10   I T Y               PROM 8 min   Ice pack during PROM 10min  8min    AROM R shoulder flexion to 90*    x10   Manual              IFC or US              Ice  Issued for home Declined             HEP           Assessment:   Pt tolerated all exercises well with minimal difficulty reporting no " pain at end of session.     Plan:  Continue to improve R UE strength and ROM as tolerated  OP PT Plan  Treatment/Interventions: Education/ Instruction, Electrical stimulation, Manual therapy, Neuromuscular re-education, Taping techniques, Therapeutic activities, Therapeutic exercises, Ultrasound  PT Plan: Skilled PT  PT Frequency: 2 times per week  Duration: 8-10 weeks (from IE 9/4/24)  Onset Date: 09/04/24  Certification Period Start Date: 08/26/24  Certification Period End Date: 12/31/24  Rehab Potential: Fair  Plan of Care Agreement: Patient    Goals:  Active       PT Problem       Patient will demonstrate ability to raise 3lb dumbbell in RUE shoulder flexion and/or scaption to 90 degrees without increase in 2 points on pain scale from baseline.  (Progressing)       Start:  09/04/24    Expected End:  12/04/24            Patient will achieve right shoulder flexion and abduction ROM greater than or equal to 115 degrees for functional mobility for ADLs and household chores. (Progressing)       Start:  09/04/24    Expected End:  12/04/24            Patient will demonstrate independence in home program for support of progression (Progressing)       Start:  09/04/24    Expected End:  12/04/24         Goal Note       Patient will demonstrate independence in home program for support of progression              Patient will report pain of less than or equal to 4/10 throughout treatment session with stretching and strengthening interventions demonstrating a reduction of overall pain. (Progressing)       Start:  09/04/24    Expected End:  12/04/24         Goal Note       Patient will report pain of 4/10 demonstrating a reduction of overall pain              Patient will show a significant change in QuickDASH patient reported outcome tool to demonstrate subjective improvement.        Start:  09/04/24    Expected End:  12/04/24

## 2024-10-01 ENCOUNTER — APPOINTMENT (OUTPATIENT)
Dept: PHYSICAL THERAPY | Facility: HOSPITAL | Age: 54
End: 2024-10-01
Payer: MEDICAID

## 2024-10-01 ENCOUNTER — DOCUMENTATION (OUTPATIENT)
Dept: PHYSICAL THERAPY | Facility: HOSPITAL | Age: 54
End: 2024-10-01
Payer: MEDICAID

## 2024-10-01 NOTE — PROGRESS NOTES
Physical Therapy                 Therapy Communication Note    Patient Name: Sharon Reyes  MRN: [id number]  Today's Date: 10/1/2024     Discipline: Physical Therapy    Missed Time: Cancel    Missed Visit Reason:  Treatment session canceled due to pt having no ride this date.

## 2024-10-03 ENCOUNTER — DOCUMENTATION (OUTPATIENT)
Dept: PHYSICAL THERAPY | Facility: HOSPITAL | Age: 54
End: 2024-10-03
Payer: MEDICAID

## 2024-10-03 ENCOUNTER — APPOINTMENT (OUTPATIENT)
Dept: INFUSION THERAPY | Facility: HOSPITAL | Age: 54
End: 2024-10-03
Payer: MEDICAID

## 2024-10-03 ENCOUNTER — APPOINTMENT (OUTPATIENT)
Dept: PHYSICAL THERAPY | Facility: HOSPITAL | Age: 54
End: 2024-10-03
Payer: MEDICAID

## 2024-10-03 NOTE — PROGRESS NOTES
Physical Therapy                 Therapy Communication Note    Patient Name: Sharon Reyes  MRN: [id number]  Today's Date: 10/3/2024     Discipline: Physical Therapy    Missed Time: Cancel    Missed Visit Reason:  Treatment session canceled due to pt without transportation.

## 2024-10-04 ENCOUNTER — INFUSION (OUTPATIENT)
Dept: INFUSION THERAPY | Facility: HOSPITAL | Age: 54
End: 2024-10-04
Payer: MEDICAID

## 2024-10-04 VITALS
DIASTOLIC BLOOD PRESSURE: 63 MMHG | SYSTOLIC BLOOD PRESSURE: 117 MMHG | HEART RATE: 58 BPM | TEMPERATURE: 97.8 F | RESPIRATION RATE: 18 BRPM | OXYGEN SATURATION: 97 %

## 2024-10-04 DIAGNOSIS — D50.0 IRON DEFICIENCY ANEMIA DUE TO CHRONIC BLOOD LOSS: ICD-10-CM

## 2024-10-04 PROCEDURE — 96365 THER/PROPH/DIAG IV INF INIT: CPT | Mod: INF

## 2024-10-04 PROCEDURE — 2500000004 HC RX 250 GENERAL PHARMACY W/ HCPCS (ALT 636 FOR OP/ED): Performed by: FAMILY MEDICINE

## 2024-10-04 RX ORDER — ALBUTEROL SULFATE 0.83 MG/ML
3 SOLUTION RESPIRATORY (INHALATION) AS NEEDED
OUTPATIENT
Start: 2024-10-10

## 2024-10-04 RX ORDER — HEPARIN SODIUM,PORCINE/PF 10 UNIT/ML
50 SYRINGE (ML) INTRAVENOUS AS NEEDED
OUTPATIENT
Start: 2024-10-04

## 2024-10-04 RX ORDER — EPINEPHRINE 0.3 MG/.3ML
0.3 INJECTION SUBCUTANEOUS EVERY 5 MIN PRN
OUTPATIENT
Start: 2024-10-10

## 2024-10-04 RX ORDER — HEPARIN 100 UNIT/ML
500 SYRINGE INTRAVENOUS AS NEEDED
OUTPATIENT
Start: 2024-10-04

## 2024-10-04 RX ORDER — FAMOTIDINE 10 MG/ML
20 INJECTION INTRAVENOUS ONCE AS NEEDED
OUTPATIENT
Start: 2024-10-10

## 2024-10-04 RX ORDER — DIPHENHYDRAMINE HYDROCHLORIDE 50 MG/ML
50 INJECTION INTRAMUSCULAR; INTRAVENOUS AS NEEDED
OUTPATIENT
Start: 2024-10-10

## 2024-10-04 ASSESSMENT — ENCOUNTER SYMPTOMS
DEPRESSION: 0
OCCASIONAL FEELINGS OF UNSTEADINESS: 1
LOSS OF SENSATION IN FEET: 0

## 2024-10-30 ENCOUNTER — OFFICE VISIT (OUTPATIENT)
Dept: PRIMARY CARE | Facility: CLINIC | Age: 54
End: 2024-10-30
Payer: MEDICAID

## 2024-10-30 VITALS
WEIGHT: 181 LBS | DIASTOLIC BLOOD PRESSURE: 72 MMHG | OXYGEN SATURATION: 98 % | BODY MASS INDEX: 29.09 KG/M2 | HEART RATE: 59 BPM | RESPIRATION RATE: 16 BRPM | HEIGHT: 66 IN | SYSTOLIC BLOOD PRESSURE: 122 MMHG

## 2024-10-30 DIAGNOSIS — E03.9 ACQUIRED HYPOTHYROIDISM: ICD-10-CM

## 2024-10-30 DIAGNOSIS — L30.9 ECZEMA OF FACE: ICD-10-CM

## 2024-10-30 DIAGNOSIS — R73.02 IMPAIRED GLUCOSE TOLERANCE: ICD-10-CM

## 2024-10-30 DIAGNOSIS — E53.8 B12 DEFICIENCY: ICD-10-CM

## 2024-10-30 DIAGNOSIS — N91.2 AMENORRHEA: ICD-10-CM

## 2024-10-30 DIAGNOSIS — D50.9 IRON DEFICIENCY ANEMIA, UNSPECIFIED IRON DEFICIENCY ANEMIA TYPE: ICD-10-CM

## 2024-10-30 DIAGNOSIS — E55.9 VITAMIN D DEFICIENCY: ICD-10-CM

## 2024-10-30 DIAGNOSIS — G51.0 BELL'S PALSY: ICD-10-CM

## 2024-10-30 DIAGNOSIS — D50.0 IRON DEFICIENCY ANEMIA DUE TO CHRONIC BLOOD LOSS: Primary | ICD-10-CM

## 2024-10-30 DIAGNOSIS — Z98.84 H/O GASTRIC BYPASS: ICD-10-CM

## 2024-10-30 PROBLEM — R76.8 ANTI-RNP ANTIBODIES PRESENT: Status: ACTIVE | Noted: 2024-10-30

## 2024-10-30 PROBLEM — R32 UNSPECIFIED URINARY INCONTINENCE: Status: ACTIVE | Noted: 2024-10-30

## 2024-10-30 PROBLEM — Q74.0 OTHER CONGENITAL MALFORMATIONS OF UPPER LIMB(S), INCLUDING SHOULDER GIRDLE: Status: ACTIVE | Noted: 2024-10-30

## 2024-10-30 PROBLEM — M20.11 HALLUX VALGUS (ACQUIRED), RIGHT FOOT: Status: ACTIVE | Noted: 2024-10-30

## 2024-10-30 PROBLEM — N83.9 NONINFLAMMATORY DISORDER OF OVARY, FALLOPIAN TUBE AND BROAD LIGAMENT, UNSPECIFIED: Status: ACTIVE | Noted: 2024-10-30

## 2024-10-30 PROBLEM — M20.42 OTHER HAMMER TOE(S) (ACQUIRED), LEFT FOOT: Status: ACTIVE | Noted: 2024-10-30

## 2024-10-30 PROBLEM — M25.519 SHOULDER PAIN: Status: ACTIVE | Noted: 2024-10-30

## 2024-10-30 PROBLEM — R42 LIGHTHEADEDNESS: Status: RESOLVED | Noted: 2024-06-05 | Resolved: 2024-10-30

## 2024-10-30 PROBLEM — R10.9 ABDOMINAL PAIN: Status: ACTIVE | Noted: 2024-10-30

## 2024-10-30 PROBLEM — M79.2 NEUROPATHIC PAIN: Status: RESOLVED | Noted: 2024-06-05 | Resolved: 2024-10-30

## 2024-10-30 PROCEDURE — 3008F BODY MASS INDEX DOCD: CPT | Performed by: FAMILY MEDICINE

## 2024-10-30 PROCEDURE — 99215 OFFICE O/P EST HI 40 MIN: CPT | Performed by: FAMILY MEDICINE

## 2024-10-30 PROCEDURE — 1036F TOBACCO NON-USER: CPT | Performed by: FAMILY MEDICINE

## 2024-10-30 RX ORDER — POLYETHYLENE GLYCOL 400 AND PROPYLENE GLYCOL 4; 3 MG/ML; MG/ML
1 SOLUTION/ DROPS OPHTHALMIC 3 TIMES DAILY PRN
Qty: 10 ML | Refills: 11 | Status: SHIPPED | OUTPATIENT
Start: 2024-10-30

## 2024-10-30 RX ORDER — LEVOTHYROXINE SODIUM 112 UG/1
TABLET ORAL
COMMUNITY
Start: 2024-09-24

## 2024-10-30 RX ORDER — BACITRACIN ZINC AND POLYMYXIN B SULFATE 500; 10000 [USP'U]/G; [USP'U]/G
OINTMENT OPHTHALMIC
COMMUNITY
Start: 2024-09-16

## 2024-10-30 RX ORDER — MOMETASONE FUROATE 1 MG/ML
SOLUTION TOPICAL DAILY
Qty: 30 ML | Refills: 2 | Status: SHIPPED | OUTPATIENT
Start: 2024-10-30 | End: 2025-10-30

## 2024-10-30 ASSESSMENT — ANXIETY QUESTIONNAIRES
4. TROUBLE RELAXING: NOT AT ALL
7. FEELING AFRAID AS IF SOMETHING AWFUL MIGHT HAPPEN: NOT AT ALL
3. WORRYING TOO MUCH ABOUT DIFFERENT THINGS: NOT AT ALL
1. FEELING NERVOUS, ANXIOUS, OR ON EDGE: NOT AT ALL
2. NOT BEING ABLE TO STOP OR CONTROL WORRYING: NOT AT ALL
GAD7 TOTAL SCORE: 0
5. BEING SO RESTLESS THAT IT IS HARD TO SIT STILL: NOT AT ALL
IF YOU CHECKED OFF ANY PROBLEMS ON THIS QUESTIONNAIRE, HOW DIFFICULT HAVE THESE PROBLEMS MADE IT FOR YOU TO DO YOUR WORK, TAKE CARE OF THINGS AT HOME, OR GET ALONG WITH OTHER PEOPLE: NOT DIFFICULT AT ALL
6. BECOMING EASILY ANNOYED OR IRRITABLE: NOT AT ALL

## 2024-10-30 ASSESSMENT — ENCOUNTER SYMPTOMS
LOSS OF SENSATION IN FEET: 0
DEPRESSION: 0
OCCASIONAL FEELINGS OF UNSTEADINESS: 0

## 2024-10-30 ASSESSMENT — PATIENT HEALTH QUESTIONNAIRE - PHQ9
1. LITTLE INTEREST OR PLEASURE IN DOING THINGS: NOT AT ALL
2. FEELING DOWN, DEPRESSED OR HOPELESS: NOT AT ALL
SUM OF ALL RESPONSES TO PHQ9 QUESTIONS 1 AND 2: 0

## 2024-10-31 ENCOUNTER — APPOINTMENT (OUTPATIENT)
Dept: PRIMARY CARE | Facility: CLINIC | Age: 54
End: 2024-10-31
Payer: MEDICAID

## 2024-11-05 ENCOUNTER — APPOINTMENT (OUTPATIENT)
Dept: RHEUMATOLOGY | Facility: CLINIC | Age: 54
End: 2024-11-05
Payer: MEDICAID

## 2024-11-27 ENCOUNTER — APPOINTMENT (OUTPATIENT)
Dept: RADIOLOGY | Facility: HOSPITAL | Age: 54
End: 2024-11-27
Payer: MEDICAID

## 2024-12-02 ENCOUNTER — APPOINTMENT (OUTPATIENT)
Dept: OBSTETRICS AND GYNECOLOGY | Facility: CLINIC | Age: 54
End: 2024-12-02
Payer: MEDICAID

## 2025-01-28 ENCOUNTER — DOCUMENTATION (OUTPATIENT)
Dept: PHYSICAL THERAPY | Facility: HOSPITAL | Age: 55
End: 2025-01-28
Payer: MEDICAID

## 2025-01-28 NOTE — PROGRESS NOTES
Physical Therapy    Discharge Summary    Name: Sharon Reyes  MRN: [id number]  : 1970  Date: 25    Discharge Summary: PT    Discharge Information: Date of last visit 24, Date of evaluation 24, Number of attended visits 6, Referred by Lucille Leung, and Referred for R shoulder injury and pain    Rehab Discharge Reason: Failed to schedule and/or keep follow-up appointment(s) and Other POC lapse 24

## 2025-02-06 ENCOUNTER — APPOINTMENT (OUTPATIENT)
Dept: PRIMARY CARE | Facility: CLINIC | Age: 55
End: 2025-02-06
Payer: MEDICAID

## 2025-05-19 ENCOUNTER — OFFICE VISIT (OUTPATIENT)
Dept: NEUROLOGY | Facility: HOSPITAL | Age: 55
End: 2025-05-19
Payer: MEDICAID

## 2025-05-19 VITALS
SYSTOLIC BLOOD PRESSURE: 131 MMHG | HEART RATE: 62 BPM | DIASTOLIC BLOOD PRESSURE: 76 MMHG | WEIGHT: 194 LBS | BODY MASS INDEX: 31.33 KG/M2

## 2025-05-19 DIAGNOSIS — R13.10 DYSPHAGIA, UNSPECIFIED TYPE: ICD-10-CM

## 2025-05-19 DIAGNOSIS — G51.0 BELL'S PALSY: Primary | ICD-10-CM

## 2025-05-19 PROCEDURE — 99214 OFFICE O/P EST MOD 30 MIN: CPT | Performed by: PSYCHIATRY & NEUROLOGY

## 2025-05-19 PROCEDURE — 1036F TOBACCO NON-USER: CPT | Performed by: PSYCHIATRY & NEUROLOGY

## 2025-05-19 RX ORDER — POLYETHYLENE GLYCOL 3350 17 G/17G
17 POWDER, FOR SOLUTION ORAL DAILY
COMMUNITY

## 2025-05-19 ASSESSMENT — PAIN SCALES - GENERAL: PAINLEVEL_OUTOF10: 0-NO PAIN

## 2025-05-19 ASSESSMENT — ENCOUNTER SYMPTOMS
LOSS OF SENSATION IN FEET: 1
DEPRESSION: 0
OCCASIONAL FEELINGS OF UNSTEADINESS: 1

## 2025-05-19 NOTE — LETTER
"May 21, 2025     Fany Lawrence MD  [address] [facility] [address] [facility] [address]  [facility] [address]    Patient: Sharon Reyes   YOB: 1970   Date of Visit: 5/19/2025       Dear Dr. Fany Lawrence MD:    Thank you for referring Sharon Reyes to me for evaluation. Below are my notes for this consultation.  If you have questions, please do not hesitate to call me. I look forward to following your patient along with you.       Sincerely,     Kendrick Purcell MD      CC: No Recipients  ______________________________________________________________________________________    Follow-up visit    Visit type: Follow-up    PCP: Fany Lawrence MD.    Subjective    Sharon Reyes is a 55 y.o. year old female here for follow-up. Last seen 6/5/24.     Patient is accompanied by none.       HPI    I first saw her 6/5/24. On 5/25/24, L side of head was hurting. Next day developed pain in front of L ear/inside ear canal. States felt funny on L face. Went to ER on 5/27/24, dx Bell's palsy. Also found to have anemia (chronic). No mention of ear pain in ER note. Prescribed antiviral and steroids, advised take Tylenol, referred to neurology. Went back to ER, due to dizziness on 5/31/24. Per ER note it was documented that she had ? vertigo and nausea. BP was 136/83, otherwise ok. She was told dizziness was ? Due to having L eye patched. Anemia again noted, pt did not want transfusion. Pt discharged. When I saw her, says L cheek feels funny. Overall ? a bit better. Taping eye shut. 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 Tells me today \"pain still there\". Pulsating. Normally didn't have pain. L ear canal \"feels swollen\". Of note this was not mentioned in ER note. Wants medication to try. Interfering with sleep. No tick bite. No autoimmune disease. No hx lupus. She describes continued dizziness, described as lightheadedness. Not vertigo. She does say it seems to be better " "starting yesterday. Known chronic anemia, used to get iron transfusions. Not getting blood transfusions--states she doesn't believe in it. No known glaucoma. No known heart issues. No tobacco. No alcohol. (+) Marijuana use. During last visit, advised continue eye care, try amitriptyline 25mg at bedtime, PT referral, and follow-up in 4 mo and sooner if worsening.    Pt just now coming back.    States did PT. Her symptoms did improve at about the 4 month inés, and she expected and thought it will get better still so didn't followup.    Back almost a year into the onset of symptoms now. States she has to \"think\" when she swallows. Describes some dysphagia but no choking per se. Feels \"tight\" on L face.  No more pain on face. Feels \"swollen\" in L face.    Not taking levothyroxine. She says she stopped a bunch of her meds as she is trying to research \"natural\" ways of managing.      Problem List[1]    Allergies[2]  Current Medications[3]     Objective    /76   Pulse 62   Wt 88 kg (194 lb)   BMI 31.33 kg/m²        Awake, alert, oriented x3, in no distress  Well-nourished, ambulatory independently    Mental status exam as above, conversant   Full EOMs intact, no nystagmus, no ptosis   Some facial droop on L still (peripheral)  Hearing grossly intact   No dysarthria    Motor strength is at least antigravity on all extremities  Normal gait      Lab Results   Component Value Date    WBC 5.2 08/26/2024    RBC 3.74 (L) 08/26/2024    HGB 7.9 (L) 08/26/2024    HCT 29.3 (L) 08/26/2024     08/26/2024     05/27/2024    K 4.2 05/27/2024     05/27/2024    BUN 17 05/27/2024    CREATININE 0.99 05/27/2024    EGFR 68 05/27/2024    CALCIUM 9.1 05/27/2024    ALKPHOS 83 05/27/2024    AST 22 05/27/2024    ALT 16 05/27/2024    MG 1.86 09/05/2023    DKALXFIS00 109 (L) 07/25/2024    VITD25 29 (L) 07/25/2024    HGBA1C 5.8 (H) 07/25/2024    TSH 0.17 (L) 08/26/2024        CT head wo IV contrast " "05/27/2024    Narrative  Interpreted By:  Nando Serrano,  STUDY:  CT HEAD WO IV CONTRAST;  5/27/2024 5:45 pm    INDICATION:  Signs/Symptoms:Was sided facial tingling, headache.    COMPARISON:  Previous exam is from 04/21/2011.    ACCESSION NUMBER(S):  [id number]    ORDERING CLINICIAN:  SYLVIE VACA    TECHNIQUE:  Routine axial images were obtained from the skull base through the  vertex. Sagittal and coronal reconstruction images were generated.  Brain, subdural, and bone windows were reviewed. N/A Unavailable    FINDINGS:  INTRACRANIAL:  The ventricles, sulci and basal cisterns were within normal limits.  No acute intracranial bleed, midline shift, or focal mass effect.  No destructive bone lesion. No depressed skull fracture.  No abnormal skull base arterial calcifications.    EXTRACRANIAL:  Visualized paranasal sinuses were clear.  Visualized mastoid air cells were clear.    Impression  Negative exam.    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 Dysphagia    Discussed with pt, unclear why she is describing dysphagia. It doesn't sound like just from pocketing food between the cheek and gum, as can happen in bell's palsy. Will refer for speech and swallow evaluation.    Discussed, the \"left face tightening\" is related to residual from bell's palsy. It appears pt still has, and will have, residual deficits from her bell's palsy (presumed).     Discussed, unfortunately it is almost a year out now and she still has symptoms. It is possible (and likely), unfortunately, she may very well near her new normal.    4. Dizziness  5. Chronic anemia  Dizziness sounds like lightheadedness (likely related to anemia) as well as component of from " strabismus (?)--states patching eye improved it  Not discussed today      Plans:  Do MRI brain wwo  Refer for speech therapy     Rtc after testing     All questions were answered.  Pt knows how to contact my office in case pt has any questions or concerns.    Kendrick Purcell MD              [1]  Patient Active Problem List  Diagnosis   • Bell's palsy   • Anemia   • Difficulty eating   • Right shoulder injury, subsequent encounter   • Impaired range of motion of right shoulder   • Injury of right shoulder   • Abdominal pain   • Hallux valgus (acquired), right foot   • Noninflammatory disorder of ovary, fallopian tube and broad ligament, unspecified   • Other congenital malformations of upper limb(s), including shoulder girdle   • Other hammer toe(s) (acquired), left foot   • Shoulder pain   • Unspecified urinary incontinence   • Anti-RNP antibodies present   • Impaired glucose tolerance   • Amenorrhea   • Acquired hypothyroidism   • Eczema of face   • Vitamin D deficiency   • B12 deficiency   • H/O gastric bypass   [2]  Allergies  Allergen Reactions   • Toradol [Ketorolac] Other     diaphoresis   [3]    Current Outpatient Medications:   •  acetaminophen (Tylenol) 325 mg tablet, Take 1 tablet (325 mg) by mouth every 6 hours if needed for mild pain (1 - 3). 4 tabs each time, Disp: , Rfl:   •  polyethylene glycol (Glycolax, Miralax) 17 gram packet, Take 17 g by mouth once daily., Disp: , Rfl:   •  UNABLE TO FIND, Med Name: giantom, 4 tsp every day for chronic pain (fibro, arthritis), Disp: , Rfl:        [1]  Patient Active Problem List  Diagnosis   • Bell's palsy   • Anemia   • Difficulty eating   • Right shoulder injury, subsequent encounter   • Impaired range of motion of right shoulder   • Injury of right shoulder   • Abdominal pain   • Hallux valgus (acquired), right foot   • Noninflammatory disorder of ovary, fallopian tube and broad ligament, unspecified   • Other congenital malformations of upper limb(s), including  shoulder girdle   • Other hammer toe(s) (acquired), left foot   • Shoulder pain   • Unspecified urinary incontinence   • Anti-RNP antibodies present   • Impaired glucose tolerance   • Amenorrhea   • Acquired hypothyroidism   • Eczema of face   • Vitamin D deficiency   • B12 deficiency   • H/O gastric bypass   [2]  Allergies  Allergen Reactions   • Toradol [Ketorolac] Other     diaphoresis   [3]    Current Outpatient Medications:   •  acetaminophen (Tylenol) 325 mg tablet, Take 1 tablet (325 mg) by mouth every 6 hours if needed for mild pain (1 - 3). 4 tabs each time, Disp: , Rfl:   •  polyethylene glycol (Glycolax, Miralax) 17 gram packet, Take 17 g by mouth once daily., Disp: , Rfl:   •  UNABLE TO FIND, Med Name: robles, 4 tsp every day for chronic pain (fibro, arthritis), Disp: , Rfl:

## 2025-05-19 NOTE — PROGRESS NOTES
"Follow-up visit    Visit type: Follow-up    PCP: Fany Lawrence MD.    Subjective     Sharon Reyes is a 55 y.o. year old female here for follow-up. Last seen 6/5/24.     Patient is accompanied by none.       HPI    I first saw her 6/5/24. On 5/25/24, L side of head was hurting. Next day developed pain in front of L ear/inside ear canal. States felt funny on L face. Went to ER on 5/27/24, dx Bell's palsy. Also found to have anemia (chronic). No mention of ear pain in ER note. Prescribed antiviral and steroids, advised take Tylenol, referred to neurology. Went back to ER, due to dizziness on 5/31/24. Per ER note it was documented that she had ? vertigo and nausea. BP was 136/83, otherwise ok. She was told dizziness was ? Due to having L eye patched. Anemia again noted, pt did not want transfusion. Pt discharged. When I saw her, says L cheek feels funny. Overall ? a bit better. Taping eye shut. 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 Tells me today \"pain still there\". Pulsating. Normally didn't have pain. L ear canal \"feels swollen\". Of note this was not mentioned in ER note. Wants medication to try. Interfering with sleep. No tick bite. No autoimmune disease. No hx lupus. She describes continued dizziness, described as lightheadedness. Not vertigo. She does say it seems to be better starting yesterday. Known chronic anemia, used to get iron transfusions. Not getting blood transfusions--states she doesn't believe in it. No known glaucoma. No known heart issues. No tobacco. No alcohol. (+) Marijuana use. During last visit, advised continue eye care, try amitriptyline 25mg at bedtime, PT referral, and follow-up in 4 mo and sooner if worsening.    Pt just now coming back.    States did PT. Her symptoms did improve at about the 4 month inés, and she expected and thought it will get better still so didn't followup.    Back almost a year into the onset of symptoms now. States she " "has to \"think\" when she swallows. Describes some dysphagia but no choking per se. Feels \"tight\" on L face.  No more pain on face. Feels \"swollen\" in L face.    Not taking levothyroxine. She says she stopped a bunch of her meds as she is trying to research \"natural\" ways of managing.      Problem List[1]    Allergies[2]  Current Medications[3]     Objective     /76   Pulse 62   Wt 88 kg (194 lb)   BMI 31.33 kg/m²        Awake, alert, oriented x3, in no distress  Well-nourished, ambulatory independently    Mental status exam as above, conversant   Full EOMs intact, no nystagmus, no ptosis   Some facial droop on L still (peripheral)  Hearing grossly intact   No dysarthria    Motor strength is at least antigravity on all extremities  Normal gait      Lab Results   Component Value Date    WBC 5.2 08/26/2024    RBC 3.74 (L) 08/26/2024    HGB 7.9 (L) 08/26/2024    HCT 29.3 (L) 08/26/2024     08/26/2024     05/27/2024    K 4.2 05/27/2024     05/27/2024    BUN 17 05/27/2024    CREATININE 0.99 05/27/2024    EGFR 68 05/27/2024    CALCIUM 9.1 05/27/2024    ALKPHOS 83 05/27/2024    AST 22 05/27/2024    ALT 16 05/27/2024    MG 1.86 09/05/2023    [id number] 109 (L) 07/25/2024    VITD25 29 (L) 07/25/2024    HGBA1C 5.8 (H) 07/25/2024    TSH 0.17 (L) 08/26/2024        CT head wo IV contrast 05/27/2024    Narrative  Interpreted By:  Nando Serrano,  STUDY:  CT HEAD WO IV CONTRAST;  5/27/2024 5:45 pm    INDICATION:  Signs/Symptoms:Was sided facial tingling, headache.    COMPARISON:  Previous exam is from 04/21/2011.    ACCESSION NUMBER(S):  [id number]    ORDERING CLINICIAN:  SYLVIE VACA    TECHNIQUE:  Routine axial images were obtained from the skull base through the  vertex. Sagittal and coronal reconstruction images were generated.  Brain, subdural, and bone windows were reviewed. N/A Unavailable    FINDINGS:  INTRACRANIAL:  The ventricles, sulci and basal cisterns were within normal limits.  No acute " "intracranial bleed, midline shift, or focal mass effect.  No destructive bone lesion. No depressed skull fracture.  No abnormal skull base arterial calcifications.    EXTRACRANIAL:  Visualized paranasal sinuses were clear.  Visualized mastoid air cells were clear.    Impression  Negative exam.    MACRO:  None    Signed by: Nando Serrano 5/27/2024 6:20 PM  Dictation workstation:   [id number]    Assessment/Plan     Facial nerve palsy, L  L ear pain  Onset 5/25/24  Head CT wo ok  S/p antiviral and steroids  Had peripheral CN VII palsy on L on exam  S/p amitriptyline for neuropathic pain  Lost to followup  S/p PT    3. Dysphagia    Discussed with pt, unclear why she is describing dysphagia. It doesn't sound like just from pocketing food between the cheek and gum, as can happen in bell's palsy. Will refer for speech and swallow evaluation.    Discussed, the \"left face tightening\" is related to residual from bell's palsy. It appears pt still has, and will have, residual deficits from her bell's palsy (presumed).     Discussed, unfortunately it is almost a year out now and she still has symptoms. It is possible (and likely), unfortunately, she may very well near her new normal.    4. Dizziness  5. Chronic anemia  Dizziness sounds like lightheadedness (likely related to anemia) as well as component of from strabismus (?)--states patching eye improved it  Not discussed today      Plans:  Do MRI brain wwo  Refer for speech therapy     Rtc after testing     All questions were answered.  Pt knows how to contact my office in case pt has any questions or concerns.    Kendrick Purcell MD              [1]   Patient Active Problem List  Diagnosis    Bell's palsy    Anemia    Difficulty eating    Right shoulder injury, subsequent encounter    Impaired range of motion of right shoulder    Injury of right shoulder    Abdominal pain    Hallux valgus (acquired), right foot    Noninflammatory disorder of ovary, fallopian tube and broad " ligament, unspecified    Other congenital malformations of upper limb(s), including shoulder girdle    Other hammer toe(s) (acquired), left foot    Shoulder pain    Unspecified urinary incontinence    Anti-RNP antibodies present    Impaired glucose tolerance    Amenorrhea    Acquired hypothyroidism    Eczema of face    Vitamin D deficiency    B12 deficiency    H/O gastric bypass   [2]   Allergies  Allergen Reactions    Toradol [Ketorolac] Other     diaphoresis   [3]   Current Outpatient Medications:     acetaminophen (Tylenol) 325 mg tablet, Take 1 tablet (325 mg) by mouth every 6 hours if needed for mild pain (1 - 3). 4 tabs each time, Disp: , Rfl:     polyethylene glycol (Glycolax, Miralax) 17 gram packet, Take 17 g by mouth once daily., Disp: , Rfl:     UNABLE TO FIND, Med Name: kratom, 4 tsp every day for chronic pain (fibro, arthritis), Disp: , Rfl:

## 2025-05-21 PROBLEM — R13.10 DYSPHAGIA: Status: ACTIVE | Noted: 2025-05-21

## 2025-06-02 ENCOUNTER — HOSPITAL ENCOUNTER (OUTPATIENT)
Dept: RADIOLOGY | Facility: HOSPITAL | Age: 55
Discharge: HOME | End: 2025-06-02
Payer: MEDICAID

## 2025-06-02 DIAGNOSIS — G51.0 BELL'S PALSY: ICD-10-CM

## 2025-06-02 PROCEDURE — 70553 MRI BRAIN STEM W/O & W/DYE: CPT | Performed by: RADIOLOGY

## 2025-06-02 PROCEDURE — 2550000001 HC RX 255 CONTRASTS: Performed by: PSYCHIATRY & NEUROLOGY

## 2025-06-02 PROCEDURE — 70553 MRI BRAIN STEM W/O & W/DYE: CPT

## 2025-06-02 PROCEDURE — A9575 INJ GADOTERATE MEGLUMI 0.1ML: HCPCS | Performed by: PSYCHIATRY & NEUROLOGY

## 2025-06-02 RX ORDER — GADOTERATE MEGLUMINE 376.9 MG/ML
17 INJECTION INTRAVENOUS
Status: COMPLETED | OUTPATIENT
Start: 2025-06-02 | End: 2025-06-02

## 2025-06-02 RX ADMIN — GADOTERATE MEGLUMINE 17 ML: 376.9 INJECTION INTRAVENOUS at 17:37

## 2025-06-03 ENCOUNTER — APPOINTMENT (OUTPATIENT)
Dept: NEUROLOGY | Facility: HOSPITAL | Age: 55
End: 2025-06-03
Payer: MEDICAID

## 2025-06-09 ENCOUNTER — TELEPHONE (OUTPATIENT)
Dept: NEUROLOGY | Facility: HOSPITAL | Age: 55
End: 2025-06-09
Payer: MEDICAID

## 2025-06-11 ENCOUNTER — EVALUATION (OUTPATIENT)
Dept: SPEECH THERAPY | Facility: HOSPITAL | Age: 55
End: 2025-06-11
Payer: MEDICAID

## 2025-06-11 DIAGNOSIS — R13.10 DYSPHAGIA, UNSPECIFIED TYPE: ICD-10-CM

## 2025-06-11 DIAGNOSIS — R13.12 OROPHARYNGEAL DYSPHAGIA: ICD-10-CM

## 2025-06-11 DIAGNOSIS — G51.0 BELL'S PALSY: ICD-10-CM

## 2025-06-11 DIAGNOSIS — G51.0 BELL'S PALSY: Primary | ICD-10-CM

## 2025-06-11 PROCEDURE — 92526 ORAL FUNCTION THERAPY: CPT | Mod: GN

## 2025-06-11 PROCEDURE — 92610 EVALUATE SWALLOWING FUNCTION: CPT | Mod: GN

## 2025-06-11 ASSESSMENT — PAIN - FUNCTIONAL ASSESSMENT: PAIN_FUNCTIONAL_ASSESSMENT: 0-10

## 2025-06-11 NOTE — LETTER
June 11, 2025    Fany Lawrence MD  [address] [facility]  [place] [address]    Patient: Sharon Reyes   YOB: 1970   Date of Visit: 6/11/2025       Dear Kendrick Purcell MD  [address] [facility]  [place] [address]    The attached plan of care is being sent to you because your patient’s medical reimbursement requires that you certify the plan of care. Your signature is required to allow uninterrupted insurance coverage.      You may indicate your approval by signing below and faxing this form back to us at Dept Fax: [phone].    Please call Dept: [phone] with any questions or concerns.    Thank you for this referral,        ROSELYN Castro  [address] [address]  [phone]    Payer: Payor: [employer] / Plan: [employer] / Product Type: *No Product type* /                                                                         Date:     Dear ROSELYN Castro,     Re: Ms. Sharon Reyes, MRN:[id number]    I certify that I have reviewed the attached plan of care and it is medically necessary for Ms. Sharon Reyes (1970) who is under my care.          ______________________________________                    _________________  Provider name and credentials                                           Date and time                                                                                           Plan of Care 5/19/25   Effective from: 5/19/2025  Effective to: 9/3/2025    Plan ID: [id number]                Participants as of Finalize on 6/11/2025      Name Type Comments Contact Info    Fany Lawrence MD PCP - General  [phone]    Jennifer Meier SLP Speech Language Pathologist  [phone]           Last Plan Note       Author: ROSELYN Castro Status: Signed Last edited: 6/11/2025 10:15 AM         Speech-Language  Pathology Clinical Swallow Evaluation    Patient Name: Sharon Reyes  MRN: [id number]  : 1970  Today's Date: 2025  Time Calculation  Start Time: 1000  Stop Time: 1110  Time Calculation (min): 70 min      ASSESSMENT  Impressions:  Moderate oral phase and suspected mild pharyngeal phase dysphagia based on clinical swallow evaluation.     Skilled dysphagia therapy is medically necessary and ordered by a physician/PATY in order to minimize aspiration risk and ensure ongoing safety with the least restrictive diet. Without skilled speech therapy services, pt is at risk for further sequelae of dysphagia, including but not limited to weight loss, respiratory compromise, hospitalization and death.    Prognosis: Fair    PLAN  Recommendations:  Is MBSS recommended? Yes; MBSS is recommended in order to objectively assess for aspiration risk, safest/least restrictive diet, and any effective compensatory strategies.  Solid consistency: Minced/moist (IDDSI level 5)  Liquid consistency: Thin (IDDSI 0)  Medication administration: Whole in thin liquid  Compensatory swallow strategies:  - Upright positioning for all PO intake  - Slow rate of intake  - Small bites  - Small sips  - One bite/sip at a time  - Effortful swallow  - Double swallow  -No mixed consistencies on the spoon    Recommended frequency/duration:  Skilled SLP services recommended: Yes  Frequency: 1x/week  Duration: 12 weeks  Treatment/Interventions: Bolus trials, Assess diet tolerance, Diet recommendations, Complete MBSS, Sensory stimulation, Oral motor exercises, Pharyngeal exercises, Patient/family education, Thermal stimulation  Strengths: Cognition, Age, Baseline functional status, and Low severity of deficits  Barriers to participation in tx: N/A    Payor authorization information:  Payor: [facility] [employer] / Plan: [facility] [employer] / Product Type: *No Product type* /   Certification Period Start Date: 25  Certification  Period End Date: 05/19/26  Number of Authorized Treatments : 30  Total Number of Visits : 1    Short Term Goals: 6/11/-/25  Pt will complete an MBSS  Pt will tolerate recommended diet without observed clinical signs of aspiration in 90% of bites and sips  Pt will demonstrate appropriate strategies for swallowing safety with 80% acc independently  Pt will progress to advanced diet consistency with no oral dysphagia in 90% of bites   Pt will complete lingual and labial ROM and strengthening exercises with 80% accuracy and min cues  Pt will rate improvement in lingual and labial ROM to 80% of baseline  Pt will complete speech production tasks with targeted sounds in short phrases with 80% acc and min cues   Status: Goal initiated   Progress this date: N/A    Long-Term Goals: 6/11/9/03/25  Pt will meet 100% of nutrition/hydration needs by mouth with all bites and sips, via least restrictive diet, without pulmonary compromise.  Pt will independently use compensatory strategies with 100% accuracy  Patient will report improvement in EAT-10 score (27/40 at evaluation)  Pt will demonstrate independent and accurate completion of oral motor strengthening and ROM exercises  Pt will rate improvement of lingual and labial ROM and strength to 95% of baseline  Pt will rate speech intelligibility in conversation to 95% intelligibility.    SUBJECTIVE  PMHx relevant to rehab:   Patient was diagnosed with Bell's Palsy May 2024  Chief complaint:   Pt reports impairment L side of face, including eye, nasal passage, cheek, lips (lower greater than upper) and buccal cavities that has not resolved since her Bell's Palsy diagnosis. The patient reported that her voice is weaker and that she has a hyphophonic voice. 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 Finally, the patietn reported that she is unable to wear her dentures due to restricted ROM and physical changes in her oral cavity. This upsets  the patient tremendously, causing her to be labile at times. SLP  provided emotional  support.      General Visit Information:     Patient Class: Outpatient  Living Environment: Home  Ordering Physician: Dr. Lawrence  Reason for Referral: dysphagia s/p [place] Palsy    Date of Onset: 05/27/24  Date of Order: 05/19/25  BaseLine Diet: regular/thin    Pain Assessment  Pain Assessment: 0-10    Pt was alert, pleasant, cooperative, attentive, and engaged for session.  Ability to follow functional commands: WFL  Nutritional status: Appears well-nourished/no concerns             Patient positioning: Upright in chair      OBJECTIVE  Clinical swallow evaluation completed and consisted of interview, oral motor assessment, and PO trials (water , pureed and minced solids).    ORAL PHASE: Patient edentulous. Oral mucosa were pink, moist, and free of obvious lesions. Lingual strength and ROM were reduced. Labial strength/ROM were reduced. Labial seal was adequate.  Patient noted to press solids against roof of mouth, unable to masticate with gums. Solids were not broken down, just flattened to the point that the patient could swallow them. Increased effort noted with pudding, with resultant reduced a-p propulsion and mild oral stasis. A/P transit and oral clearance were WFL for liquids and minced solids.    PHARYNGEAL PHASE: Laryngeal elevation was visualized or palpated with all trials, however adequacy of hyolaryngeal elevation/excursion cannot be determined at bedside. When patient completed alternating liquids and solids, she demonstrated a throat clear following the sip of water with all trials. The patient completed the three ounce water challenge with her water bottle (has small pop up straw). No overt signs of aspiration were noted. Single sips of water were consumed without overt signs or symptoms of aspiration as well.      Was 3oz challenge administered: Yes; pt drank 3oz of thin liquid in one attempt, without breaking,  with no overt s/sx aspiration/penetration observed.    Patient-Reported Outcome Measures:  EAT 10  My swallowing problem has caused me to lose weight.: 3  My swallowing problem interferes with my ability to go out for meals.: 4  Swallowing liquids takes extra effort.: 3  Swallowing solids takes extra effort.: 4  Swallowing pills takes extra effort.: 0  Swallowing is painful: 0  The pleasure of eating is affected by my swallowing.: 4  When I swallow food sticks in my throat.: 3  I cough when I eat.: 2  Swallowing is stressful: 4  EAT-10 TOTAL SCORE:: 27  A total score of 3 or above may indicate difficulty with swallowing safely and/or efficiently.    Functional Oral Intake Scale:  Functional Oral Intake Scale: Level 5        total oral diet with multiple consistencies, but requires special preparations and compensations    Home Exercise Program:  The following tasks/exercises were provided to pt/family for home exercise. Lingual and labial ROM and strengthening exercises, diet consistency recommendations and swallow strategies. Training for appropriate completion of tasks/exercises was provided:      Treatment/Education:  Results and recommendations were relayed to: Patient  Education provided: Yes   Learner: Patient   Barriers to learning: None   Method of teaching: Verbal, Written, Demonstration, and Visual   Topic: role of ST, results of assessment, risk for aspiration, recommended diet modifications, recommendation for MBSS, recommended safe swallow strategies, swallow anatomy/physiology, and recommendation for dysphagia follow-up   Outcome of teaching: Pt/family demonstrated good understanding, Pt verbalized understanding, Education will be reinforced during follow-up visits, as appropriate, and teachback/return demonstration  Treatment provided: Yes  Next Treatment Priority: oral motor exercises, cold therapy and massage; completion of MBSS as soon as it can be scheduled.    Due to the patient's risk of  aspiration SLP is recommending completion of an MBSS to fully assess oropharyngeal anatomy and physiology and to r/o aspiration. The patient verbalized comprehension of the information presented.         Current Participants as of 6/11/2025      Name Type Comments Contact Info    Fany Lawrence MD PCP - General  [phone]    Signature pending    ROSELYN Castro Speech Language Pathologist  [phone]    Electronically signed by ROSELYN Castro at 6/11/2025 7486 EDT

## 2025-06-11 NOTE — PROGRESS NOTES
Speech-Language Pathology Clinical Swallow Evaluation    Patient Name: Sharon Reyes  MRN: [id number]  : 1970  Today's Date: 2025  Time Calculation  Start Time: 1000  Stop Time: 1110  Time Calculation (min): 70 min      ASSESSMENT  Impressions:  Moderate oral phase and suspected mild pharyngeal phase dysphagia based on clinical swallow evaluation.     Skilled dysphagia therapy is medically necessary and ordered by a physician/PATY in order to minimize aspiration risk and ensure ongoing safety with the least restrictive diet. Without skilled speech therapy services, pt is at risk for further sequelae of dysphagia, including but not limited to weight loss, respiratory compromise, hospitalization and death.    Prognosis: Fair    PLAN  Recommendations:  Is MBSS recommended? Yes; MBSS is recommended in order to objectively assess for aspiration risk, safest/least restrictive diet, and any effective compensatory strategies.  Solid consistency: Minced/moist (IDDSI level 5)  Liquid consistency: Thin (IDDSI 0)  Medication administration: Whole in thin liquid  Compensatory swallow strategies:  - Upright positioning for all PO intake  - Slow rate of intake  - Small bites  - Small sips  - One bite/sip at a time  - Effortful swallow  - Double swallow  -No mixed consistencies on the spoon    Recommended frequency/duration:  Skilled SLP services recommended: Yes  Frequency: 1x/week  Duration: 12 weeks  Treatment/Interventions: Bolus trials, Assess diet tolerance, Diet recommendations, Complete MBSS, Sensory stimulation, Oral motor exercises, Pharyngeal exercises, Patient/family education, Thermal stimulation  Strengths: Cognition, Age, Baseline functional status, and Low severity of deficits  Barriers to participation in tx: N/A    Payor authorization information:  Payor: [facility] [employer] / Plan: [facility] [employer]ICA[employer] / Product Type: *No Product type* /   Certification Period Start Date:  05/19/25  Certification Period End Date: 05/19/26  Number of Authorized Treatments : 30  Total Number of Visits : 1    Short Term Goals: 6/11/-/25  Pt will complete an MBSS  Pt will tolerate recommended diet without observed clinical signs of aspiration in 90% of bites and sips  Pt will demonstrate appropriate strategies for swallowing safety with 80% acc independently  Pt will progress to advanced diet consistency with no oral dysphagia in 90% of bites   Pt will complete lingual and labial ROM and strengthening exercises with 80% accuracy and min cues  Pt will rate improvement in lingual and labial ROM to 80% of baseline  Pt will complete speech production tasks with targeted sounds in short phrases with 80% acc and min cues   Status: Goal initiated   Progress this date: N/A    Long-Term Goals: 6/11/9/03/25  Pt will meet 100% of nutrition/hydration needs by mouth with all bites and sips, via least restrictive diet, without pulmonary compromise.  Pt will independently use compensatory strategies with 100% accuracy  Patient will report improvement in EAT-10 score (27/40 at evaluation)  Pt will demonstrate independent and accurate completion of oral motor strengthening and ROM exercises  Pt will rate improvement of lingual and labial ROM and strength to 95% of baseline  Pt will rate speech intelligibility in conversation to 95% intelligibility.    SUBJECTIVE  PMHx relevant to rehab:   Patient was diagnosed with Bell's Palsy May 2024  Chief complaint:   Pt reports impairment L side of face, including eye, nasal passage, cheek, lips (lower greater than upper) and buccal cavities that has not resolved since her Bell's Palsy diagnosis. The patient reported that her voice is weaker and that she has a hyphophonic voice. 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 Finally, the patietn reported that she is unable to wear her dentures due to restricted ROM and physical changes in her  oral cavity. This upsets the patient tremendously, causing her to be labile at times. SLP  provided emotional  support.      General Visit Information:     Patient Class: Outpatient  Living Environment: Home  Ordering Physician: Dr. Lawrence  Reason for Referral: dysphagia s/p [place] Palsy    Date of Onset: 05/27/24  Date of Order: 05/19/25  BaseLine Diet: regular/thin    Pain Assessment  Pain Assessment: 0-10    Pt was alert, pleasant, cooperative, attentive, and engaged for session.  Ability to follow functional commands: WFL  Nutritional status: Appears well-nourished/no concerns             Patient positioning: Upright in chair      OBJECTIVE  Clinical swallow evaluation completed and consisted of interview, oral motor assessment, and PO trials (water , pureed and minced solids).    ORAL PHASE: Patient edentulous. Oral mucosa were pink, moist, and free of obvious lesions. Lingual strength and ROM were reduced. Labial strength/ROM were reduced. Labial seal was adequate.  Patient noted to press solids against roof of mouth, unable to masticate with gums. Solids were not broken down, just flattened to the point that the patient could swallow them. Increased effort noted with pudding, with resultant reduced a-p propulsion and mild oral stasis. A/P transit and oral clearance were WFL for liquids and minced solids.    PHARYNGEAL PHASE: Laryngeal elevation was visualized or palpated with all trials, however adequacy of hyolaryngeal elevation/excursion cannot be determined at bedside. When patient completed alternating liquids and solids, she demonstrated a throat clear following the sip of water with all trials. The patient completed the three ounce water challenge with her water bottle (has small pop up straw). No overt signs of aspiration were noted. Single sips of water were consumed without overt signs or symptoms of aspiration as well.      Was 3oz challenge administered: Yes; pt drank 3oz of thin liquid in one  attempt, without breaking, with no overt s/sx aspiration/penetration observed.    Patient-Reported Outcome Measures:  EAT 10  My swallowing problem has caused me to lose weight.: 3  My swallowing problem interferes with my ability to go out for meals.: 4  Swallowing liquids takes extra effort.: 3  Swallowing solids takes extra effort.: 4  Swallowing pills takes extra effort.: 0  Swallowing is painful: 0  The pleasure of eating is affected by my swallowing.: 4  When I swallow food sticks in my throat.: 3  I cough when I eat.: 2  Swallowing is stressful: 4  EAT-10 TOTAL SCORE:: 27  A total score of 3 or above may indicate difficulty with swallowing safely and/or efficiently.    Functional Oral Intake Scale:  Functional Oral Intake Scale: Level 5        total oral diet with multiple consistencies, but requires special preparations and compensations    Home Exercise Program:  The following tasks/exercises were provided to pt/family for home exercise. Lingual and labial ROM and strengthening exercises, diet consistency recommendations and swallow strategies. Training for appropriate completion of tasks/exercises was provided:      Treatment/Education:  Results and recommendations were relayed to: Patient  Education provided: Yes   Learner: Patient   Barriers to learning: None   Method of teaching: Verbal, Written, Demonstration, and Visual   Topic: role of ST, results of assessment, risk for aspiration, recommended diet modifications, recommendation for MBSS, recommended safe swallow strategies, swallow anatomy/physiology, and recommendation for dysphagia follow-up   Outcome of teaching: Pt/family demonstrated good understanding, Pt verbalized understanding, Education will be reinforced during follow-up visits, as appropriate, and teachback/return demonstration  Treatment provided: Yes  Next Treatment Priority: oral motor exercises, cold therapy and massage; completion of MBSS as soon as it can be scheduled.    Due to  the patient's risk of aspiration SLP is recommending completion of an MBSS to fully assess oropharyngeal anatomy and physiology and to r/o aspiration. The patient verbalized comprehension of the information presented.

## 2025-06-18 ENCOUNTER — TREATMENT (OUTPATIENT)
Dept: SPEECH THERAPY | Facility: HOSPITAL | Age: 55
End: 2025-06-18
Payer: MEDICAID

## 2025-06-18 DIAGNOSIS — R13.10 DYSPHAGIA, UNSPECIFIED TYPE: ICD-10-CM

## 2025-06-18 PROCEDURE — 92526 ORAL FUNCTION THERAPY: CPT | Mod: GN

## 2025-06-18 ASSESSMENT — PAIN SCALES - GENERAL: PAINLEVEL_OUTOF10: 0 - NO PAIN

## 2025-06-18 ASSESSMENT — PAIN - FUNCTIONAL ASSESSMENT: PAIN_FUNCTIONAL_ASSESSMENT: 0-10

## 2025-06-18 NOTE — PROGRESS NOTES
Speech-Language Pathology    SLP Adult Outpatient Speech-Language Pathology Treatment     Patient Name: Sharon Reyes  MRN: [id number]  Today's Date: 6/18/2025     Time Calculation  Start Time: 1215  Stop Time: 1300  Time Calculation (min): 45 min      Current Problem:   1. Dysphagia, unspecified type  Follow Up In Speech Therapy            SLP Assessment:  SLP TX Intervention Outcome: Making Progress Towards Goals  Prognosis: Fair  Treatment Tolerance: Patient tolerated treatment well  Education Provided: Yes       Plan:  Inpatient/Swing Bed or Outpatient: Outpatient  SLP Frequency: 1x per week  Duration: 12 weeks  Next Treatment Priority: targeted sounds in all positions, labial exercises  Patient/Caregiver Agreeable: Yes       General Visit Information:  Certification Period Start Date: 06/11/25  Certification Period End Date: 06/11/26  Number of Authorized Treatments : 30  Total Number of Visits : 2      Pain Assessment:  Pain Assessment  Pain Assessment: 0-10  0-10 (Numeric) Pain Score: 0 - No pain      Objective   Patient seen for dysphagia and speech therapy. Patient able to recall diet consistency recommendations and swallow strategies independently. Still waiting for order for MBSS. Patient reported rare instances of coughing with liquids. SLP also discussed patient contacting dentist for revision of dentures and the impact of dentition on speech and swallowing.  SLP presented labial exercises to increase ROM and strength. SLP modeled and patient completed with good effort. Target sounds of /m/ /b/ /l/ were introduced in all positions of single words. Again SLP modeled and the patient repeated.    Therapeutic Swallow:  Therapeutic Swallow Intervention : Compensatory Strategies, Oral Strengthening Techniques      Motor Speech:    Labial exercises, labial and lingual word targets      Outpatient Education:  Adult Outpatient Education  Individual(s) Educated: Patient  Written Education : labial exercises, speech  production targets  Patient Response to Education: Patient/Caregiver Verbalized Understanding of Information, Patient/Caregiver Performed Return Demonstration of Exercises/Activities, Patient/Caregiver Asked Appropriate Questions           Short Term Goals: 6/11/-9/03/25  Pt will complete an MBSS  Pt will tolerate recommended diet without observed clinical signs of aspiration in 90% of bites and sips  Pt will demonstrate appropriate strategies for swallowing safety with 80% acc independently  Pt will progress to advanced diet consistency with no oral dysphagia in 90% of bites   Pt will complete lingual and labial ROM and strengthening exercises with 80% accuracy and min cues  Pt will rate improvement in speech intelligibility to 80%  in short phrases with 80% acc and min cues              Status: Progressing              Progress this date: Labial exercises completed with 90% acc given min cues. Patient instructed to use a mirror at home. Pt completed articulation tasks with target in all positions of single words with 90% acc given min cues.     Long-Term Goals: 6/11/-9/03/25  Pt will meet 100% of nutrition/hydration needs by mouth with all bites and sips, via least restrictive diet, without pulmonary compromise.  Pt will independently use compensatory strategies with 100% accuracy  Patient will report improvement in EAT-10 score (27/40 at evaluation)  Pt will demonstrate independent and accurate completion of oral motor strengthening and ROM exercises  Pt will rate improvement of lingual and labial ROM and strength to 95% of baseline  Pt will rate speech intelligibility in conversation to 95% intelligibility.    Status: Progressing

## 2025-06-25 ENCOUNTER — APPOINTMENT (OUTPATIENT)
Dept: SPEECH THERAPY | Facility: HOSPITAL | Age: 55
End: 2025-06-25
Payer: MEDICAID

## 2025-07-02 ENCOUNTER — TREATMENT (OUTPATIENT)
Dept: SPEECH THERAPY | Facility: HOSPITAL | Age: 55
End: 2025-07-02
Payer: MEDICAID

## 2025-07-02 DIAGNOSIS — R13.10 DYSPHAGIA, UNSPECIFIED TYPE: ICD-10-CM

## 2025-07-02 PROCEDURE — 92507 TX SP LANG VOICE COMM INDIV: CPT | Mod: GN

## 2025-07-02 ASSESSMENT — PAIN SCALES - GENERAL: PAINLEVEL_OUTOF10: 0 - NO PAIN

## 2025-07-02 NOTE — PROGRESS NOTES
Speech-Language Pathology    SLP Adult Outpatient Speech-Language Pathology Treatment     Patient Name: Sharon Reyes  MRN: [id number]  Today's Date: 7/2/2025     Time Calculation  Start Time: 1215  Stop Time: 1245  Time Calculation (min): 30 min      Current Problem:   1. Dysphagia, unspecified type  Follow Up In Speech Therapy            SLP Assessment:  SLP TX Intervention Outcome: Making Progress Towards Goals  Prognosis: Good  Treatment Tolerance: Patient tolerated treatment well  Education Provided: Yes       Plan:  Inpatient/Swing Bed or Outpatient: Outpatient  Treatment/Interventions: Dysphagia treatment, Oral motor exercises, Articulation  SLP TX Plan: Continue Plan of Care  SLP Plan: Skilled SLP  SLP Frequency: 1x per week  Duration: 12 weeks  Next Treatment Priority: trials of soft solids       General Visit Information:  Certification Period Start Date: 06/11/25  Certification Period End Date: 06/11/26  Number of Authorized Treatments : 30  Total Number of Visits : 3      Pain Assessment:  Pain Assessment  0-10 (Numeric) Pain Score: 0 - No pain      Objective   Patient seen for dysphagia and speech therapy. Pt reported that she has a dentist appointment tomorrow to realign her dentures. She remains on a minced diet and thin liquids. No report of coughing/throat clearing with water. SLP to discontinue order for MBSS. Pt also completing HEP as instructed. SLP noted improvement in labial strength, reduced degree of facial droop and increased ROM L side.    Therapeutic Swallow:  Therapeutic Swallow Intervention : Compensatory Strategies, Oral Strengthening Techniques      Motor Speech:   Motor Speech Intervention : Compensatory Speech Strategies, Word Repetitions, Oral Lingual Strengthening TechniquesLabial exercises, labial and lingual word targets      Outpatient Education:  Adult Outpatient Education  Individual(s) Educated: Patient  Written Education : labial exercises, speech production targets  Patient  Response to Education: Patient/Caregiver Verbalized Understanding of Information, Patient/Caregiver Performed Return Demonstration of Exercises/Activities, Patient/Caregiver Asked Appropriate Questions           Short Term Goals: 6/11/-9/03/25  Pt will complete an MBSS   Discontinued at this time.     Pt will tolerate recommended diet without observed clinical signs of aspiration in 90% of bites and sips   Status: progressing   Progress: No overt signs of penetration/aspiration with 6 ounces of water    Pt will demonstrate appropriate strategies for swallowing safety with 80% acc independently   Status: Progressing   Progress: Pt took single small sips with slow rate independently.    Pt will progress to advanced diet consistency with no oral dysphagia in 90% of bites    Status: Not addressed    Pt will complete lingual and labial ROM and strengthening exercises with 80% accuracy and min cues   Status: Progressing   Progress: Pt with 100% accuracy with min cues for exercises    Pt will rate improvement in speech intelligibility to 80%  in short phrases with 80% acc and min cues              Status: Progressing              Progress this date: Pt completed articulation tasks with target in all positions of single words with 80% acc independently, 100% with min cues.     Long-Term Goals: 6/11/-9/03/25  Pt will meet 100% of nutrition/hydration needs by mouth with all bites and sips, via least restrictive diet, without pulmonary compromise.  Pt will independently use compensatory strategies with 100% accuracy  Patient will report improvement in EAT-10 score (27/40 at evaluation)  Pt will demonstrate independent and accurate completion of oral motor strengthening and ROM exercises  Pt will rate improvement of lingual and labial ROM and strength to 95% of baseline  Pt will rate speech intelligibility in conversation to 95% intelligibility.    Status: Progressing

## 2025-07-08 DIAGNOSIS — Z12.31 ENCOUNTER FOR SCREENING MAMMOGRAM FOR BREAST CANCER: ICD-10-CM

## 2025-07-09 ENCOUNTER — TREATMENT (OUTPATIENT)
Dept: SPEECH THERAPY | Facility: HOSPITAL | Age: 55
End: 2025-07-09
Payer: MEDICAID

## 2025-07-09 DIAGNOSIS — R13.10 DYSPHAGIA, UNSPECIFIED TYPE: ICD-10-CM

## 2025-07-09 PROCEDURE — 92526 ORAL FUNCTION THERAPY: CPT | Mod: GN

## 2025-07-09 NOTE — PROGRESS NOTES
Speech-Language Pathology    [facility] Adult Outpatient Speech-Language Pathology Treatment     Patient Name: Sharon Reyes  MRN: [id number]  Today's Date: 7/10/2025   Time In: 11:00  Time Out: 11:30           Current Problem:   1. Dysphagia, unspecified type  Follow Up In Speech Therapy            SLP Assessment:  SLP TX Intervention Outcome: Making Progress Towards Goals  Prognosis: Good  Treatment Tolerance: Patient tolerated treatment well, Treatment limited secondary to medical complications  Education Provided: Yes       Plan:  Inpatient/Swing Bed or Outpatient: Outpatient  Treatment/Interventions: Dysphagia treatment, Oral motor exercises  SLP TX Plan: Continue Plan of Care  SLP Plan: Skilled SLP  SLP Frequency: 1x per week  Duration: 12 weeks  Next Treatment Priority: lingual and labial execises  Discussed POC: Patient  Discussed Risks/Benefits: Yes, Patient  Patient/Caregiver Agreeable: Yes       General Visit Information:  Certification Period Start Date: 06/11/25  Certification Period End Date: 06/11/26  Number of Authorized Treatments : 30  Total Number of Visits : 4      Pain Assessment:  Pain Assessment  0-10 (Numeric) Pain Score: 0 - No pain      Objective   Patient seen for dysphagia and speech therapy. The patient was very upset this date. She had her dentures re aligned and the bottom is not fitting well. She reported discomfort with eating and talking and occasional pain when the lower denture pinched her gums. The patient feels disrespected by the dentist and his staff and that they are not listening to her. The session ended early due to the patient being upset.    Therapeutic Swallow:  Solid Diet Recommendations: Minced and moist (IDDSI Level 5)  Liquid Diet Recommendations: Thin (IDDSI Level 0)      Motor Speech:   Motor Speech Intervention : Oral Lingual Strengthening TechniquesLabial exercises, labial and lingual word targets      Outpatient Education:  Adult Outpatient Education  Individual(s)  Educated: Patient  Patient Response to Education: Patient/Caregiver Verbalized Understanding of Information           Short Term Goals: 6/11/-9/03/25  Pt will complete an MBSS   Discontinued at this time.     Pt will tolerate recommended diet without observed clinical signs of aspiration in 90% of bites and sips   Status: progressing   Progress: Not addressed    Pt will demonstrate appropriate strategies for swallowing safety with 80% acc independently   Status: Progressing   Progress: Not addressed    Pt will progress to advanced diet consistency with no oral dysphagia in 90% of bites    Status: Not Progressing   Progress: Dentures do not fit well for eating    Pt will complete lingual and labial ROM and strengthening exercises with 80% accuracy and min cues   Status: Progressing   Progress: Pt with 100% accuracy with min cues for exercises    Pt will rate improvement in speech intelligibility to 80%  in short phrases with 80% acc and min cues              Status: Progressing              Progress this date: Pt speech in conversation this date with dentures in was 90% intelligible, with no cues given.     Long-Term Goals: 6/11/-9/03/25  Pt will meet 100% of nutrition/hydration needs by mouth with all bites and sips, via least restrictive diet, without pulmonary compromise.  Pt will independently use compensatory strategies with 100% accuracy  Patient will report improvement in EAT-10 score (27/40 at evaluation)  Pt will demonstrate independent and accurate completion of oral motor strengthening and ROM exercises  Pt will rate improvement of lingual and labial ROM and strength to 95% of baseline  Pt will rate speech intelligibility in conversation to 95% intelligibility.    Status: Progressing

## 2025-07-10 ASSESSMENT — PAIN SCALES - GENERAL: PAINLEVEL_OUTOF10: 0 - NO PAIN

## 2025-07-16 ENCOUNTER — TREATMENT (OUTPATIENT)
Dept: SPEECH THERAPY | Facility: HOSPITAL | Age: 55
End: 2025-07-16
Payer: MEDICAID

## 2025-07-16 DIAGNOSIS — R13.10 DYSPHAGIA, UNSPECIFIED TYPE: ICD-10-CM

## 2025-07-16 PROCEDURE — 92526 ORAL FUNCTION THERAPY: CPT | Mod: GN

## 2025-07-16 ASSESSMENT — PAIN SCALES - GENERAL: PAINLEVEL_OUTOF10: 0 - NO PAIN

## 2025-07-16 NOTE — PROGRESS NOTES
Speech-Language Pathology    Discharge Summary    Name: Sharon Reyes  MRN: [id number]  : 1970  Date: 25    Discharge Summary: SLP    Discharge Information:   Date of discharge 25  Date of last visit 25  Date of evaluation 25  Number of attended visits 5  Referred by Dr. Lawrence  Referred for Speech therapy due to dysphagia    Therapy Summary:   Short Term Goals: -25  Pt will tolerate recommended diet without observed clinical signs of aspiration in 90% of bites and sips   Status: Goal Met  Pt will demonstrate appropriate strategies for swallowing safety with 80% acc independently   Status: Goal Met  Pt will progress to advanced diet consistency with no oral dysphagia in 90% of bites    Status: Goal Not Met due to difficulty with alignment of dentures  Pt will complete lingual and labial ROM and strengthening exercises with 80% accuracy and min cues   Status: Goal Met  Pt will rate improvement in lingual and labial ROM to 80% of baseline   Status: Goal Met  Pt will complete speech production tasks with targeted sounds in short phrases with 80% acc and min cues              Status: Goal Met                Long-Term Goals: /25  Pt will meet 100% of nutrition/hydration needs by mouth with all bites and sips, via least restrictive diet, without pulmonary compromise.  Status: Goal Met  Pt will independently use compensatory strategies with 100% accuracy  Status: Goal Met  Patient will report improvement in EAT-10 score (27/40 at evaluation)  Status: Goal Met; pt updated score is 11/40. Many of the higher scores are due to being edentulous  Pt will demonstrate independent and accurate completion of oral motor strengthening and ROM exercises  Status: Goal Met  Pt will rate improvement of lingual and labial ROM and strength to 95% of baseline  Status: Mostly Met; Pt stated ROM and strength at 90%   Pt will rate speech intelligibility in conversation to 95% intelligibility.  Status:  Mostly Met; Pt stated intelligibility at 90% in conversation      Discharge Status: Pt was seen for 5 therapy sessions focusing on improving swallowing and speech production skills following dx of remote Bell's Palsy. The patient has attended all sessions, has completed HEP as requested and has made gains in all areas. She will continue to complete lingual and labial exercises independently. SLP informed the patient that she is always welcome to call with questions or concerns and can start therapy again if she develops changes in speech or swallowing. The patient verbalized comprehension of the information presented.     Rehab Discharge Reason: Achieved all and/or the most significant goals(s) and is independent with swallow strategies and exercise program.

## 2025-07-23 ENCOUNTER — APPOINTMENT (OUTPATIENT)
Dept: SPEECH THERAPY | Facility: HOSPITAL | Age: 55
End: 2025-07-23
Payer: MEDICAID

## 2025-07-30 ENCOUNTER — APPOINTMENT (OUTPATIENT)
Dept: SPEECH THERAPY | Facility: HOSPITAL | Age: 55
End: 2025-07-30
Payer: MEDICAID